# Patient Record
Sex: MALE | Race: WHITE | Employment: OTHER | URBAN - METROPOLITAN AREA
[De-identification: names, ages, dates, MRNs, and addresses within clinical notes are randomized per-mention and may not be internally consistent; named-entity substitution may affect disease eponyms.]

---

## 2024-03-08 ENCOUNTER — APPOINTMENT (OUTPATIENT)
Dept: CT IMAGING | Age: 84
DRG: 374 | End: 2024-03-08
Payer: COMMERCIAL

## 2024-03-08 ENCOUNTER — APPOINTMENT (OUTPATIENT)
Dept: GENERAL RADIOLOGY | Age: 84
DRG: 374 | End: 2024-03-08
Payer: COMMERCIAL

## 2024-03-08 ENCOUNTER — HOSPITAL ENCOUNTER (INPATIENT)
Age: 84
LOS: 6 days | Discharge: HOSPICE | DRG: 374 | End: 2024-03-14
Attending: EMERGENCY MEDICINE | Admitting: INTERNAL MEDICINE
Payer: COMMERCIAL

## 2024-03-08 DIAGNOSIS — K63.89 COLONIC MASS: ICD-10-CM

## 2024-03-08 DIAGNOSIS — R10.84 GENERALIZED ABDOMINAL PAIN: ICD-10-CM

## 2024-03-08 DIAGNOSIS — K92.0 HEMATEMESIS WITH NAUSEA: Primary | ICD-10-CM

## 2024-03-08 DIAGNOSIS — Z51.5 PALLIATIVE CARE ENCOUNTER: ICD-10-CM

## 2024-03-08 PROBLEM — R10.9 ABDOMINAL PAIN: Status: ACTIVE | Noted: 2024-03-08

## 2024-03-08 LAB
ABO + RH BLD: NORMAL
ALBUMIN SERPL-MCNC: 3.5 G/DL (ref 3.5–5.2)
ALP SERPL-CCNC: 216 U/L (ref 40–129)
ALT SERPL-CCNC: 15 U/L (ref 5–41)
ANION GAP SERPL CALCULATED.3IONS-SCNC: 20 MMOL/L (ref 9–17)
ARM BAND NUMBER: NORMAL
AST SERPL-CCNC: 18 U/L
BASOPHILS # BLD: 0 K/UL (ref 0–0.2)
BASOPHILS NFR BLD: 0 % (ref 0–2)
BILIRUB SERPL-MCNC: 0.5 MG/DL (ref 0.3–1.2)
BLOOD BANK SAMPLE EXPIRATION: NORMAL
BLOOD GROUP ANTIBODIES SERPL: NEGATIVE
BNP SERPL-MCNC: 843 PG/ML
BUN SERPL-MCNC: 38 MG/DL (ref 8–23)
CALCIUM SERPL-MCNC: 9.6 MG/DL (ref 8.6–10.4)
CHLORIDE SERPL-SCNC: 99 MMOL/L (ref 98–107)
CO2 SERPL-SCNC: 22 MMOL/L (ref 20–31)
CREAT SERPL-MCNC: 2.1 MG/DL (ref 0.7–1.2)
EOSINOPHIL # BLD: 0 K/UL (ref 0–0.4)
EOSINOPHILS RELATIVE PERCENT: 0 % (ref 0–4)
ERYTHROCYTE [DISTWIDTH] IN BLOOD BY AUTOMATED COUNT: 16.3 % (ref 11.5–14.9)
GFR SERPL CREATININE-BSD FRML MDRD: 31 ML/MIN/1.73M2
GLUCOSE SERPL-MCNC: 210 MG/DL (ref 70–99)
HCT VFR BLD AUTO: 35.5 % (ref 41–53)
HGB BLD-MCNC: 11.1 G/DL (ref 13.5–17.5)
LACTATE BLDV-SCNC: 1.2 MMOL/L (ref 0.5–1.9)
LACTATE BLDV-SCNC: 1.4 MMOL/L (ref 0.5–1.9)
LIPASE SERPL-CCNC: 11 U/L (ref 13–60)
LYMPHOCYTES NFR BLD: 1.16 K/UL (ref 1–4.8)
LYMPHOCYTES RELATIVE PERCENT: 8 % (ref 24–44)
MCH RBC QN AUTO: 25.8 PG (ref 26–34)
MCHC RBC AUTO-ENTMCNC: 31.3 G/DL (ref 31–37)
MCV RBC AUTO: 82.4 FL (ref 80–100)
MONOCYTES NFR BLD: 0.73 K/UL (ref 0.1–1.3)
MONOCYTES NFR BLD: 5 % (ref 1–7)
MORPHOLOGY: ABNORMAL
MORPHOLOGY: ABNORMAL
NEUTROPHILS NFR BLD: 87 % (ref 36–66)
NEUTS SEG NFR BLD: 12.61 K/UL (ref 1.3–9.1)
PLATELET # BLD AUTO: 646 K/UL (ref 150–450)
PMV BLD AUTO: 7 FL (ref 6–12)
POTASSIUM SERPL-SCNC: 4.6 MMOL/L (ref 3.7–5.3)
PROT SERPL-MCNC: 7.3 G/DL (ref 6.4–8.3)
RBC # BLD AUTO: 4.31 M/UL (ref 4.5–5.9)
SODIUM SERPL-SCNC: 141 MMOL/L (ref 135–144)
TROPONIN I SERPL HS-MCNC: 53 NG/L (ref 0–22)
TROPONIN I SERPL HS-MCNC: 58 NG/L (ref 0–22)
WBC OTHER # BLD: 14.5 K/UL (ref 3.5–11)

## 2024-03-08 PROCEDURE — 86900 BLOOD TYPING SEROLOGIC ABO: CPT

## 2024-03-08 PROCEDURE — 83880 ASSAY OF NATRIURETIC PEPTIDE: CPT

## 2024-03-08 PROCEDURE — C9113 INJ PANTOPRAZOLE SODIUM, VIA: HCPCS | Performed by: INTERNAL MEDICINE

## 2024-03-08 PROCEDURE — 87040 BLOOD CULTURE FOR BACTERIA: CPT

## 2024-03-08 PROCEDURE — 83605 ASSAY OF LACTIC ACID: CPT

## 2024-03-08 PROCEDURE — 86901 BLOOD TYPING SEROLOGIC RH(D): CPT

## 2024-03-08 PROCEDURE — 36415 COLL VENOUS BLD VENIPUNCTURE: CPT

## 2024-03-08 PROCEDURE — 6360000002 HC RX W HCPCS: Performed by: EMERGENCY MEDICINE

## 2024-03-08 PROCEDURE — 96375 TX/PRO/DX INJ NEW DRUG ADDON: CPT

## 2024-03-08 PROCEDURE — 6360000002 HC RX W HCPCS: Performed by: INTERNAL MEDICINE

## 2024-03-08 PROCEDURE — 85025 COMPLETE CBC W/AUTO DIFF WBC: CPT

## 2024-03-08 PROCEDURE — 0W9G30Z DRAINAGE OF PERITONEAL CAVITY WITH DRAINAGE DEVICE, PERCUTANEOUS APPROACH: ICD-10-PCS | Performed by: INTERNAL MEDICINE

## 2024-03-08 PROCEDURE — 74177 CT ABD & PELVIS W/CONTRAST: CPT

## 2024-03-08 PROCEDURE — C9113 INJ PANTOPRAZOLE SODIUM, VIA: HCPCS | Performed by: EMERGENCY MEDICINE

## 2024-03-08 PROCEDURE — 6360000004 HC RX CONTRAST MEDICATION: Performed by: EMERGENCY MEDICINE

## 2024-03-08 PROCEDURE — A4216 STERILE WATER/SALINE, 10 ML: HCPCS | Performed by: EMERGENCY MEDICINE

## 2024-03-08 PROCEDURE — 80053 COMPREHEN METABOLIC PANEL: CPT

## 2024-03-08 PROCEDURE — 84484 ASSAY OF TROPONIN QUANT: CPT

## 2024-03-08 PROCEDURE — 71045 X-RAY EXAM CHEST 1 VIEW: CPT

## 2024-03-08 PROCEDURE — 2060000000 HC ICU INTERMEDIATE R&B

## 2024-03-08 PROCEDURE — 2580000003 HC RX 258: Performed by: EMERGENCY MEDICINE

## 2024-03-08 PROCEDURE — 99285 EMERGENCY DEPT VISIT HI MDM: CPT

## 2024-03-08 PROCEDURE — 86850 RBC ANTIBODY SCREEN: CPT

## 2024-03-08 PROCEDURE — 83690 ASSAY OF LIPASE: CPT

## 2024-03-08 PROCEDURE — 99223 1ST HOSP IP/OBS HIGH 75: CPT | Performed by: INTERNAL MEDICINE

## 2024-03-08 PROCEDURE — 2580000003 HC RX 258: Performed by: INTERNAL MEDICINE

## 2024-03-08 PROCEDURE — 96365 THER/PROPH/DIAG IV INF INIT: CPT

## 2024-03-08 PROCEDURE — 93005 ELECTROCARDIOGRAM TRACING: CPT | Performed by: EMERGENCY MEDICINE

## 2024-03-08 RX ORDER — SODIUM CHLORIDE 9 MG/ML
INJECTION, SOLUTION INTRAVENOUS PRN
Status: DISCONTINUED | OUTPATIENT
Start: 2024-03-08 | End: 2024-03-14 | Stop reason: HOSPADM

## 2024-03-08 RX ORDER — SODIUM CHLORIDE 0.9 % (FLUSH) 0.9 %
10 SYRINGE (ML) INJECTION PRN
Status: COMPLETED | OUTPATIENT
Start: 2024-03-08 | End: 2024-03-08

## 2024-03-08 RX ORDER — POTASSIUM CHLORIDE 20 MEQ/1
40 TABLET, EXTENDED RELEASE ORAL PRN
Status: DISCONTINUED | OUTPATIENT
Start: 2024-03-08 | End: 2024-03-11

## 2024-03-08 RX ORDER — SODIUM CHLORIDE 9 MG/ML
INJECTION, SOLUTION INTRAVENOUS ONCE
Status: COMPLETED | OUTPATIENT
Start: 2024-03-08 | End: 2024-03-08

## 2024-03-08 RX ORDER — IBUPROFEN 200 MG
200 TABLET ORAL EVERY 6 HOURS PRN
COMMUNITY

## 2024-03-08 RX ORDER — 0.9 % SODIUM CHLORIDE 0.9 %
1000 INTRAVENOUS SOLUTION INTRAVENOUS ONCE
Status: COMPLETED | OUTPATIENT
Start: 2024-03-08 | End: 2024-03-08

## 2024-03-08 RX ORDER — ONDANSETRON 2 MG/ML
4 INJECTION INTRAMUSCULAR; INTRAVENOUS EVERY 6 HOURS PRN
Status: DISCONTINUED | OUTPATIENT
Start: 2024-03-08 | End: 2024-03-14 | Stop reason: HOSPADM

## 2024-03-08 RX ORDER — ENOXAPARIN SODIUM 100 MG/ML
40 INJECTION SUBCUTANEOUS DAILY
Status: DISCONTINUED | OUTPATIENT
Start: 2024-03-08 | End: 2024-03-14 | Stop reason: HOSPADM

## 2024-03-08 RX ORDER — POTASSIUM CHLORIDE 7.45 MG/ML
10 INJECTION INTRAVENOUS PRN
Status: DISCONTINUED | OUTPATIENT
Start: 2024-03-08 | End: 2024-03-11

## 2024-03-08 RX ORDER — POLYETHYLENE GLYCOL 3350 17 G/17G
17 POWDER, FOR SOLUTION ORAL DAILY PRN
Status: DISCONTINUED | OUTPATIENT
Start: 2024-03-08 | End: 2024-03-14 | Stop reason: HOSPADM

## 2024-03-08 RX ORDER — MAGNESIUM SULFATE HEPTAHYDRATE 40 MG/ML
2000 INJECTION, SOLUTION INTRAVENOUS PRN
Status: DISCONTINUED | OUTPATIENT
Start: 2024-03-08 | End: 2024-03-11

## 2024-03-08 RX ORDER — SODIUM CHLORIDE 0.9 % (FLUSH) 0.9 %
5-40 SYRINGE (ML) INJECTION PRN
Status: DISCONTINUED | OUTPATIENT
Start: 2024-03-08 | End: 2024-03-14 | Stop reason: HOSPADM

## 2024-03-08 RX ORDER — METRONIDAZOLE 500 MG/100ML
500 INJECTION, SOLUTION INTRAVENOUS ONCE
Status: COMPLETED | OUTPATIENT
Start: 2024-03-08 | End: 2024-03-08

## 2024-03-08 RX ORDER — 0.9 % SODIUM CHLORIDE 0.9 %
1600 INTRAVENOUS SOLUTION INTRAVENOUS ONCE
Status: COMPLETED | OUTPATIENT
Start: 2024-03-08 | End: 2024-03-08

## 2024-03-08 RX ORDER — SODIUM CHLORIDE 0.9 % (FLUSH) 0.9 %
5-40 SYRINGE (ML) INJECTION EVERY 12 HOURS SCHEDULED
Status: DISCONTINUED | OUTPATIENT
Start: 2024-03-08 | End: 2024-03-14 | Stop reason: HOSPADM

## 2024-03-08 RX ORDER — ACETAMINOPHEN 650 MG/1
650 SUPPOSITORY RECTAL EVERY 6 HOURS PRN
Status: DISCONTINUED | OUTPATIENT
Start: 2024-03-08 | End: 2024-03-14 | Stop reason: HOSPADM

## 2024-03-08 RX ORDER — ONDANSETRON 4 MG/1
4 TABLET, ORALLY DISINTEGRATING ORAL EVERY 8 HOURS PRN
Status: DISCONTINUED | OUTPATIENT
Start: 2024-03-08 | End: 2024-03-14 | Stop reason: HOSPADM

## 2024-03-08 RX ORDER — ACETAMINOPHEN 325 MG/1
650 TABLET ORAL EVERY 6 HOURS PRN
Status: DISCONTINUED | OUTPATIENT
Start: 2024-03-08 | End: 2024-03-14 | Stop reason: HOSPADM

## 2024-03-08 RX ADMIN — PANTOPRAZOLE SODIUM 40 MG: 40 INJECTION, POWDER, FOR SOLUTION INTRAVENOUS at 18:06

## 2024-03-08 RX ADMIN — SODIUM CHLORIDE, PRESERVATIVE FREE 10 ML: 5 INJECTION INTRAVENOUS at 16:35

## 2024-03-08 RX ADMIN — METRONIDAZOLE 500 MG: 500 INJECTION, SOLUTION INTRAVENOUS at 18:14

## 2024-03-08 RX ADMIN — PIPERACILLIN AND TAZOBACTAM 4500 MG: 4; .5 INJECTION, POWDER, FOR SOLUTION INTRAVENOUS at 23:32

## 2024-03-08 RX ADMIN — SODIUM CHLORIDE 1600 ML: 9 INJECTION, SOLUTION INTRAVENOUS at 15:45

## 2024-03-08 RX ADMIN — PANTOPRAZOLE SODIUM 8 MG/HR: 40 INJECTION, POWDER, FOR SOLUTION INTRAVENOUS at 23:26

## 2024-03-08 RX ADMIN — SODIUM CHLORIDE 1000 ML: 9 INJECTION, SOLUTION INTRAVENOUS at 15:22

## 2024-03-08 RX ADMIN — IOPAMIDOL 75 ML: 755 INJECTION, SOLUTION INTRAVENOUS at 16:35

## 2024-03-08 RX ADMIN — CEFTRIAXONE SODIUM 1000 MG: 1 INJECTION, POWDER, FOR SOLUTION INTRAMUSCULAR; INTRAVENOUS at 14:33

## 2024-03-08 RX ADMIN — SODIUM CHLORIDE: 9 INJECTION, SOLUTION INTRAVENOUS at 16:35

## 2024-03-08 RX ADMIN — SODIUM CHLORIDE 40 MG: 9 INJECTION INTRAMUSCULAR; INTRAVENOUS; SUBCUTANEOUS at 14:27

## 2024-03-08 RX ADMIN — OCTREOTIDE ACETATE 25 MCG/HR: 500 INJECTION, SOLUTION INTRAVENOUS; SUBCUTANEOUS at 15:24

## 2024-03-08 ASSESSMENT — LIFESTYLE VARIABLES
HOW MANY STANDARD DRINKS CONTAINING ALCOHOL DO YOU HAVE ON A TYPICAL DAY: 1 OR 2
HOW OFTEN DO YOU HAVE A DRINK CONTAINING ALCOHOL: MONTHLY OR LESS

## 2024-03-08 ASSESSMENT — PAIN - FUNCTIONAL ASSESSMENT: PAIN_FUNCTIONAL_ASSESSMENT: NONE - DENIES PAIN

## 2024-03-08 NOTE — ED PROVIDER NOTES
20 cc/kg bolus ordered. Already received rocephin which is adequate to treat presumed intra-abdmominal source as I see no evidence of extra-abdominal infection.     CT on my review appears to show large liver mass likely cancerous, unknown primary.     Discussed with Brendan Gallagher NP on call for GI, she recommends protonix gtt, may have ice chips until MN and then NPO, they will follow.     Discussed with Dr Childers who accepts patient for admission.     ED Course as of 03/08/24 1801   Fri Mar 08, 2024   1524 After initial assessment, high concern for upper GI bled due to undiagnosed liver disease so rocephin, octreotide and protonix  [MK]      ED Course User Index  [MK] Ayah Johnson MD     CRITICAL CARE:       PROCEDURES:    Procedures    DIAGNOSTIC RESULTS   EKG: All EKG's are interpreted by the Emergency Department Physician who either signs or Co-signs this chart inthe absence of a cardiologist.      RADIOLOGY:All plain film, CT, MRI, and formal ultrasound images (except ED bedside ultrasound) are read by the radiologist, see reports below, unless otherwise noted in MDM or here.  CT ABDOMEN PELVIS W IV CONTRAST Additional Contrast? None    (Results Pending)     LABS: All lab results were reviewed by myself, and all abnormals are listed below.  Labs Reviewed   COMPREHENSIVE METABOLIC PANEL - Abnormal; Notable for the following components:       Result Value    Anion Gap 20 (*)     Glucose 210 (*)     BUN 38 (*)     Creatinine 2.1 (*)     Est, Glom Filt Rate 31 (*)     Alkaline Phosphatase 216 (*)     All other components within normal limits   CBC WITH AUTO DIFFERENTIAL - Abnormal; Notable for the following components:    WBC 14.5 (*)     RBC 4.31 (*)     Hemoglobin 11.1 (*)     Hematocrit 35.5 (*)     MCH 25.8 (*)     RDW 16.3 (*)     Platelets 646 (*)     Neutrophils % 87 (*)     Lymphocytes % 8 (*)     Neutrophils Absolute 12.61 (*)     All other components within normal limits   LIPASE - Abnormal; Notable

## 2024-03-09 PROBLEM — R60.9 EDEMA: Status: ACTIVE | Noted: 2024-03-09

## 2024-03-09 PROBLEM — K92.0 HEMATEMESIS WITH NAUSEA: Status: ACTIVE | Noted: 2024-03-09

## 2024-03-09 PROBLEM — K63.89 COLONIC MASS: Status: ACTIVE | Noted: 2024-03-09

## 2024-03-09 PROBLEM — R63.4 WEIGHT LOSS: Status: ACTIVE | Noted: 2024-03-09

## 2024-03-09 PROBLEM — K76.9 LIVER LESION: Status: ACTIVE | Noted: 2024-03-09

## 2024-03-09 PROBLEM — I49.3 PVC (PREMATURE VENTRICULAR CONTRACTION): Status: ACTIVE | Noted: 2024-03-09

## 2024-03-09 PROBLEM — R79.89 TROPONIN LEVEL ELEVATED: Status: ACTIVE | Noted: 2024-03-09

## 2024-03-09 PROBLEM — R00.0 TACHYCARDIA: Status: ACTIVE | Noted: 2024-03-09

## 2024-03-09 LAB
A1AT SERPL-MCNC: 294 MG/DL (ref 90–200)
ABSOLUTE BANDS: 0.15 K/UL (ref 0–1)
AFP SERPL-MCNC: <1.8 UG/L
ALBUMIN SERPL-MCNC: 3.1 G/DL (ref 3.5–5.2)
ALP SERPL-CCNC: 180 U/L (ref 40–129)
ALT SERPL-CCNC: 12 U/L (ref 5–41)
ANION GAP SERPL CALCULATED.3IONS-SCNC: 14 MMOL/L (ref 9–17)
AST SERPL-CCNC: 13 U/L
BACTERIA URNS QL MICRO: ABNORMAL
BANDS: 1 % (ref 0–10)
BASOPHILS # BLD: 0 K/UL (ref 0–0.2)
BASOPHILS NFR BLD: 0 % (ref 0–2)
BILIRUB SERPL-MCNC: 0.5 MG/DL (ref 0.3–1.2)
BILIRUB UR QL STRIP: NEGATIVE
BUN SERPL-MCNC: 39 MG/DL (ref 8–23)
CALCIUM SERPL-MCNC: 8.9 MG/DL (ref 8.6–10.4)
CASTS #/AREA URNS LPF: ABNORMAL /LPF
CEA SERPL-MCNC: 15 NG/ML (ref 0–3.8)
CERULOPLASMIN SERPL-MCNC: 29 MG/DL (ref 15–30)
CHLORIDE SERPL-SCNC: 103 MMOL/L (ref 98–107)
CLARITY UR: ABNORMAL
CO2 SERPL-SCNC: 24 MMOL/L (ref 20–31)
COLOR UR: YELLOW
CREAT SERPL-MCNC: 2.3 MG/DL (ref 0.7–1.2)
EKG ATRIAL RATE: 117 BPM
EKG P AXIS: 56 DEGREES
EKG P-R INTERVAL: 124 MS
EKG Q-T INTERVAL: 328 MS
EKG QRS DURATION: 80 MS
EKG QTC CALCULATION (BAZETT): 457 MS
EKG R AXIS: -16 DEGREES
EKG T AXIS: 72 DEGREES
EKG VENTRICULAR RATE: 117 BPM
EOSINOPHIL # BLD: 0 K/UL (ref 0–0.4)
EOSINOPHILS RELATIVE PERCENT: 0 % (ref 0–4)
EPI CELLS #/AREA URNS HPF: ABNORMAL /HPF
ERYTHROCYTE [DISTWIDTH] IN BLOOD BY AUTOMATED COUNT: 16.3 % (ref 11.5–14.9)
FERRITIN SERPL-MCNC: 236 NG/ML (ref 30–400)
FOLATE SERPL-MCNC: 3.1 NG/ML (ref 4.8–24.2)
GFR SERPL CREATININE-BSD FRML MDRD: 27 ML/MIN/1.73M2
GLUCOSE SERPL-MCNC: 311 MG/DL (ref 70–99)
GLUCOSE UR STRIP-MCNC: NEGATIVE MG/DL
HAV IGM SERPL QL IA: NONREACTIVE
HBV CORE IGM SERPL QL IA: NONREACTIVE
HBV SURFACE AG SERPL QL IA: NONREACTIVE
HCT VFR BLD AUTO: 33.6 % (ref 41–53)
HCV AB SERPL QL IA: NONREACTIVE
HGB BLD-MCNC: 10.3 G/DL (ref 13.5–17.5)
HGB UR QL STRIP.AUTO: NEGATIVE
IRON SATN MFR SERPL: 19 % (ref 20–55)
IRON SERPL-MCNC: 32 UG/DL (ref 61–157)
KETONES UR STRIP-MCNC: ABNORMAL MG/DL
LEUKOCYTE ESTERASE UR QL STRIP: NEGATIVE
LYMPHOCYTES NFR BLD: 0.59 K/UL (ref 1–4.8)
LYMPHOCYTES RELATIVE PERCENT: 4 % (ref 24–44)
MCH RBC QN AUTO: 25.8 PG (ref 26–34)
MCHC RBC AUTO-ENTMCNC: 30.8 G/DL (ref 31–37)
MCV RBC AUTO: 83.8 FL (ref 80–100)
MONOCYTES NFR BLD: 1.47 K/UL (ref 0.1–1.3)
MONOCYTES NFR BLD: 10 % (ref 1–7)
MORPHOLOGY: ABNORMAL
MORPHOLOGY: ABNORMAL
NEUTROPHILS NFR BLD: 85 % (ref 36–66)
NEUTS SEG NFR BLD: 12.49 K/UL (ref 1.3–9.1)
NITRITE UR QL STRIP: NEGATIVE
PH UR STRIP: 5 [PH] (ref 5–8)
PLATELET # BLD AUTO: 545 K/UL (ref 150–450)
PMV BLD AUTO: 6.8 FL (ref 6–12)
POTASSIUM SERPL-SCNC: 4.5 MMOL/L (ref 3.7–5.3)
PROT SERPL-MCNC: 6.7 G/DL (ref 6.4–8.3)
PROT UR STRIP-MCNC: ABNORMAL MG/DL
RBC # BLD AUTO: 4.01 M/UL (ref 4.5–5.9)
RBC #/AREA URNS HPF: ABNORMAL /HPF
SODIUM SERPL-SCNC: 141 MMOL/L (ref 135–144)
SP GR UR STRIP: 1.03 (ref 1–1.03)
TIBC SERPL-MCNC: 169 UG/DL (ref 250–450)
UNSATURATED IRON BINDING CAPACITY: 137 UG/DL (ref 112–347)
UROBILINOGEN UR STRIP-ACNC: NORMAL EU/DL (ref 0–1)
VIT B12 SERPL-MCNC: 381 PG/ML (ref 232–1245)
WBC #/AREA URNS HPF: ABNORMAL /HPF
WBC OTHER # BLD: 14.7 K/UL (ref 3.5–11)

## 2024-03-09 PROCEDURE — 86376 MICROSOMAL ANTIBODY EACH: CPT

## 2024-03-09 PROCEDURE — 99223 1ST HOSP IP/OBS HIGH 75: CPT | Performed by: INTERNAL MEDICINE

## 2024-03-09 PROCEDURE — 99233 SBSQ HOSP IP/OBS HIGH 50: CPT | Performed by: INTERNAL MEDICINE

## 2024-03-09 PROCEDURE — 82390 ASSAY OF CERULOPLASMIN: CPT

## 2024-03-09 PROCEDURE — 81001 URINALYSIS AUTO W/SCOPE: CPT

## 2024-03-09 PROCEDURE — 82105 ALPHA-FETOPROTEIN SERUM: CPT

## 2024-03-09 PROCEDURE — 82607 VITAMIN B-12: CPT

## 2024-03-09 PROCEDURE — 83540 ASSAY OF IRON: CPT

## 2024-03-09 PROCEDURE — 80053 COMPREHEN METABOLIC PANEL: CPT

## 2024-03-09 PROCEDURE — 6360000002 HC RX W HCPCS: Performed by: INTERNAL MEDICINE

## 2024-03-09 PROCEDURE — APPNB30 APP NON BILLABLE TIME 0-30 MINS: Performed by: NURSE PRACTITIONER

## 2024-03-09 PROCEDURE — 85025 COMPLETE CBC W/AUTO DIFF WBC: CPT

## 2024-03-09 PROCEDURE — 86225 DNA ANTIBODY NATIVE: CPT

## 2024-03-09 PROCEDURE — 83516 IMMUNOASSAY NONANTIBODY: CPT

## 2024-03-09 PROCEDURE — 86038 ANTINUCLEAR ANTIBODIES: CPT

## 2024-03-09 PROCEDURE — 2580000003 HC RX 258: Performed by: INTERNAL MEDICINE

## 2024-03-09 PROCEDURE — 82746 ASSAY OF FOLIC ACID SERUM: CPT

## 2024-03-09 PROCEDURE — 82728 ASSAY OF FERRITIN: CPT

## 2024-03-09 PROCEDURE — 36415 COLL VENOUS BLD VENIPUNCTURE: CPT

## 2024-03-09 PROCEDURE — C9113 INJ PANTOPRAZOLE SODIUM, VIA: HCPCS | Performed by: INTERNAL MEDICINE

## 2024-03-09 PROCEDURE — 99222 1ST HOSP IP/OBS MODERATE 55: CPT | Performed by: INTERNAL MEDICINE

## 2024-03-09 PROCEDURE — 82103 ALPHA-1-ANTITRYPSIN TOTAL: CPT

## 2024-03-09 PROCEDURE — 82378 CARCINOEMBRYONIC ANTIGEN: CPT

## 2024-03-09 PROCEDURE — 83550 IRON BINDING TEST: CPT

## 2024-03-09 PROCEDURE — 2060000000 HC ICU INTERMEDIATE R&B

## 2024-03-09 PROCEDURE — 80074 ACUTE HEPATITIS PANEL: CPT

## 2024-03-09 RX ORDER — FUROSEMIDE 10 MG/ML
20 INJECTION INTRAMUSCULAR; INTRAVENOUS DAILY
Status: DISCONTINUED | OUTPATIENT
Start: 2024-03-09 | End: 2024-03-10

## 2024-03-09 RX ORDER — SODIUM CHLORIDE 9 MG/ML
INJECTION, SOLUTION INTRAVENOUS CONTINUOUS
Status: DISCONTINUED | OUTPATIENT
Start: 2024-03-09 | End: 2024-03-12

## 2024-03-09 RX ADMIN — PANTOPRAZOLE SODIUM 8 MG/HR: 40 INJECTION, POWDER, FOR SOLUTION INTRAVENOUS at 09:14

## 2024-03-09 RX ADMIN — SODIUM CHLORIDE: 9 INJECTION, SOLUTION INTRAVENOUS at 18:26

## 2024-03-09 RX ADMIN — PIPERACILLIN AND TAZOBACTAM 3375 MG: 3; .375 INJECTION, POWDER, LYOPHILIZED, FOR SOLUTION INTRAVENOUS at 22:58

## 2024-03-09 RX ADMIN — OCTREOTIDE ACETATE 25 MCG/HR: 500 INJECTION, SOLUTION INTRAVENOUS; SUBCUTANEOUS at 09:15

## 2024-03-09 RX ADMIN — PANTOPRAZOLE SODIUM 8 MG/HR: 40 INJECTION, POWDER, FOR SOLUTION INTRAVENOUS at 18:50

## 2024-03-09 RX ADMIN — PIPERACILLIN AND TAZOBACTAM 3375 MG: 3; .375 INJECTION, POWDER, LYOPHILIZED, FOR SOLUTION INTRAVENOUS at 13:42

## 2024-03-09 RX ADMIN — PIPERACILLIN AND TAZOBACTAM 3375 MG: 3; .375 INJECTION, POWDER, LYOPHILIZED, FOR SOLUTION INTRAVENOUS at 07:08

## 2024-03-09 RX ADMIN — FUROSEMIDE 20 MG: 10 INJECTION, SOLUTION INTRAMUSCULAR; INTRAVENOUS at 11:39

## 2024-03-09 NOTE — H&P
got admitted  Started on PPI and octreotide drip  GI consulted    GILBERTO, creatinine is 2, no previous available  Will check bladder scan  Patient has been having anasarca, will likely need Lasix, consult nephrology  Monitor I's and O's closely    Troponin elevation, patient currently denies any chest pain not a candidate for anticoagulation due to hematemesis, also had elevated proBNP, check echocardiogram consult cardiology  No previous cardiac history known to patient    Leukocytosis, patient does have mild diffuse tenderness, will order IR guided paracentesis, leukocytosis could be secondary to underlying malignancy cultures has been ordered already    Thrombocytosis likely reactive secondary to abovepatient overall prognosis is poor, CODE STATUS discussed, patient wants to be DNR CCA with no intubation    DVT prophylaxis SCDs for now  Will also check lower extremity Doppler to rule out any DVT in the presence of active malignancy    Consultations:   IP CONSULT TO NEPHROLOGY  IP CONSULT TO GI  IP CONSULT TO CARDIOLOGY  IP CONSULT TO INTERVENTIONAL RADIOLOGY  IP CONSULT TO HEM/ONC     Patient is admitted as inpatient status because of co-morbidities listed above, severity of signs and symptoms as outlined, requirement for current medical therapies and most importantly because of direct risk to patient if care not provided in a hospital setting.    Lavern Childers MD  3/8/2024  7:46 PM    Copy sent to Dr. Moseley primary care provider on file.    Please note that this chart was generated using voice recognition Dragon dictation software.  Although every effort was made to ensure the accuracy of this automated transcription, some errors in transcription may have occurred.

## 2024-03-09 NOTE — PLAN OF CARE
Problem: ABCDS Injury Assessment  Goal: Absence of physical injury  Outcome: Progressing  Note: Pt absent of any physical injury     Problem: Skin/Tissue Integrity  Goal: Absence of new skin breakdown  Description: 1.  Monitor for areas of redness and/or skin breakdown  2.  Assess vascular access sites hourly  3.  Every 4-6 hours minimum:  Change oxygen saturation probe site  4.  Every 4-6 hours:  If on nasal continuous positive airway pressure, respiratory therapy assess nares and determine need for appliance change or resting period.  Outcome: Progressing  Note: Pt absent of any new skin breakdown     Problem: Pain  Goal: Verbalizes/displays adequate comfort level or baseline comfort level  Outcome: Progressing  Note: Pt able to verbalize comfort level

## 2024-03-09 NOTE — ACP (ADVANCE CARE PLANNING)
Advance Care Planning     Advance Care Planning Activator (Inpatient)  Conversation Note      Date of ACP Conversation: 3/9/2024     Conversation Conducted with: Patient with Decision Making Capacity    ACP Activator: Jie Robins RN    Health Care Decision Maker:     Current Designated Health Care Decision Maker:     Click here to complete Healthcare Decision Makers including section of the Healthcare Decision Maker Relationship (ie \"Primary\")  Today we documented Decision Maker(s) consistent with Legal Next of Kin hierarchy.    Care Preferences    Ventilation:  \"If you were in your present state of health and suddenly became very ill and were unable to breathe on your own, what would your preference be about the use of a ventilator (breathing machine) if it were available to you?\"      Would the patient desire the use of ventilator (breathing machine)?: no    \"If your health worsens and it becomes clear that your chance of recovery is unlikely, what would your preference be about the use of a ventilator (breathing machine) if it were available to you?\"     Would the patient desire the use of ventilator (breathing machine)?: No      Resuscitation  \"CPR works best to restart the heart when there is a sudden event, like a heart attack, in someone who is otherwise healthy. Unfortunately, CPR does not typically restart the heart for people who have serious health conditions or who are very sick.\"    \"In the event your heart stopped as a result of an underlying serious health condition, would you want attempts to be made to restart your heart (answer \"yes\" for attempt to resuscitate) or would you prefer a natural death (answer \"no\" for do not attempt to resuscitate)?\" no       [] Yes   [x] No   Educated Patient / Decision Maker regarding differences between Advance Directives and portable DNR orders.    Length of ACP Conversation in minutes:      Conversation Outcomes:  ACP discussion completed    Follow-up plan:

## 2024-03-09 NOTE — PLAN OF CARE
Problem: Discharge Planning  Goal: Discharge to home or other facility with appropriate resources  3/9/2024 1657 by Radha Calderón RN  Outcome: Progressing     Problem: Safety - Adult  Goal: Free from fall injury  3/9/2024 1657 by Radha Calderón RN  Outcome: Progressing     Problem: ABCDS Injury Assessment  Goal: Absence of physical injury  3/9/2024 1657 by Radha Calderón RN  Outcome: Progressing     Problem: Skin/Tissue Integrity  Goal: Absence of new skin breakdown  Description: 1.  Monitor for areas of redness and/or skin breakdown  2.  Assess vascular access sites hourly  3.  Every 4-6 hours minimum:  Change oxygen saturation probe site  4.  Every 4-6 hours:  If on nasal continuous positive airway pressure, respiratory therapy assess nares and determine need for appliance change or resting period.  3/9/2024 1657 by Radha Calderón RN  Outcome: Progressing     Problem: Pain  Goal: Verbalizes/displays adequate comfort level or baseline comfort level  3/9/2024 1657 by Radha Calderón RN  Outcome: Progressing

## 2024-03-10 LAB
ALBUMIN SERPL-MCNC: 2.9 G/DL (ref 3.5–5.2)
ALP SERPL-CCNC: 153 U/L (ref 40–129)
ALT SERPL-CCNC: 9 U/L (ref 5–41)
ANION GAP SERPL CALCULATED.3IONS-SCNC: 10 MMOL/L (ref 9–17)
AST SERPL-CCNC: 9 U/L
BASOPHILS # BLD: 0 K/UL (ref 0–0.2)
BASOPHILS NFR BLD: 0 % (ref 0–2)
BILIRUB SERPL-MCNC: 0.4 MG/DL (ref 0.3–1.2)
BUN SERPL-MCNC: 37 MG/DL (ref 8–23)
CALCIUM SERPL-MCNC: 8.6 MG/DL (ref 8.6–10.4)
CHLORIDE SERPL-SCNC: 107 MMOL/L (ref 98–107)
CO2 SERPL-SCNC: 27 MMOL/L (ref 20–31)
CREAT SERPL-MCNC: 2.4 MG/DL (ref 0.7–1.2)
EOSINOPHIL # BLD: 0.11 K/UL (ref 0–0.4)
EOSINOPHILS RELATIVE PERCENT: 1 % (ref 0–4)
ERYTHROCYTE [DISTWIDTH] IN BLOOD BY AUTOMATED COUNT: 16.3 % (ref 11.5–14.9)
GFR SERPL CREATININE-BSD FRML MDRD: 26 ML/MIN/1.73M2
GLUCOSE SERPL-MCNC: 175 MG/DL (ref 70–99)
HCT VFR BLD AUTO: 30.8 % (ref 41–53)
HGB BLD-MCNC: 9.4 G/DL (ref 13.5–17.5)
LKM AB TITR SER IF: NORMAL {TITER}
LYMPHOCYTES NFR BLD: 1.22 K/UL (ref 1–4.8)
LYMPHOCYTES RELATIVE PERCENT: 11 % (ref 24–44)
MCH RBC QN AUTO: 25.3 PG (ref 26–34)
MCHC RBC AUTO-ENTMCNC: 30.5 G/DL (ref 31–37)
MCV RBC AUTO: 83 FL (ref 80–100)
MONOCYTES NFR BLD: 0.67 K/UL (ref 0.1–1.3)
MONOCYTES NFR BLD: 6 % (ref 1–7)
MORPHOLOGY: ABNORMAL
NEUTROPHILS NFR BLD: 82 % (ref 36–66)
NEUTS SEG NFR BLD: 9.1 K/UL (ref 1.3–9.1)
PLATELET # BLD AUTO: 467 K/UL (ref 150–450)
PMV BLD AUTO: 6.6 FL (ref 6–12)
POTASSIUM SERPL-SCNC: 3.9 MMOL/L (ref 3.7–5.3)
PROT SERPL-MCNC: 6.1 G/DL (ref 6.4–8.3)
RBC # BLD AUTO: 3.71 M/UL (ref 4.5–5.9)
SMOOTH MUSCLE ANTIBODY: 8 UNITS (ref 0–19)
SODIUM SERPL-SCNC: 144 MMOL/L (ref 135–144)
WBC OTHER # BLD: 11.1 K/UL (ref 3.5–11)

## 2024-03-10 PROCEDURE — 99231 SBSQ HOSP IP/OBS SF/LOW 25: CPT | Performed by: INTERNAL MEDICINE

## 2024-03-10 PROCEDURE — 6370000000 HC RX 637 (ALT 250 FOR IP): Performed by: INTERNAL MEDICINE

## 2024-03-10 PROCEDURE — 93005 ELECTROCARDIOGRAM TRACING: CPT | Performed by: INTERNAL MEDICINE

## 2024-03-10 PROCEDURE — P9047 ALBUMIN (HUMAN), 25%, 50ML: HCPCS | Performed by: INTERNAL MEDICINE

## 2024-03-10 PROCEDURE — 2580000003 HC RX 258: Performed by: INTERNAL MEDICINE

## 2024-03-10 PROCEDURE — 6370000000 HC RX 637 (ALT 250 FOR IP): Performed by: NURSE PRACTITIONER

## 2024-03-10 PROCEDURE — 36415 COLL VENOUS BLD VENIPUNCTURE: CPT

## 2024-03-10 PROCEDURE — 99233 SBSQ HOSP IP/OBS HIGH 50: CPT | Performed by: INTERNAL MEDICINE

## 2024-03-10 PROCEDURE — 97530 THERAPEUTIC ACTIVITIES: CPT

## 2024-03-10 PROCEDURE — 6360000002 HC RX W HCPCS: Performed by: INTERNAL MEDICINE

## 2024-03-10 PROCEDURE — 2060000000 HC ICU INTERMEDIATE R&B

## 2024-03-10 PROCEDURE — 97162 PT EVAL MOD COMPLEX 30 MIN: CPT

## 2024-03-10 PROCEDURE — C9113 INJ PANTOPRAZOLE SODIUM, VIA: HCPCS | Performed by: INTERNAL MEDICINE

## 2024-03-10 PROCEDURE — APPSS30 APP SPLIT SHARED TIME 16-30 MINUTES: Performed by: NURSE PRACTITIONER

## 2024-03-10 PROCEDURE — 80053 COMPREHEN METABOLIC PANEL: CPT

## 2024-03-10 PROCEDURE — 99232 SBSQ HOSP IP/OBS MODERATE 35: CPT | Performed by: INTERNAL MEDICINE

## 2024-03-10 PROCEDURE — 85025 COMPLETE CBC W/AUTO DIFF WBC: CPT

## 2024-03-10 RX ORDER — BISACODYL 5 MG/1
20 TABLET, DELAYED RELEASE ORAL ONCE
Status: COMPLETED | OUTPATIENT
Start: 2024-03-10 | End: 2024-03-10

## 2024-03-10 RX ORDER — LANOLIN ALCOHOL/MO/W.PET/CERES
3 CREAM (GRAM) TOPICAL NIGHTLY PRN
Status: DISCONTINUED | OUTPATIENT
Start: 2024-03-10 | End: 2024-03-14 | Stop reason: HOSPADM

## 2024-03-10 RX ORDER — ALBUMIN (HUMAN) 12.5 G/50ML
25 SOLUTION INTRAVENOUS EVERY 8 HOURS
Status: DISCONTINUED | OUTPATIENT
Start: 2024-03-10 | End: 2024-03-14 | Stop reason: HOSPADM

## 2024-03-10 RX ADMIN — PIPERACILLIN AND TAZOBACTAM 3375 MG: 3; .375 INJECTION, POWDER, LYOPHILIZED, FOR SOLUTION INTRAVENOUS at 23:46

## 2024-03-10 RX ADMIN — BISACODYL 20 MG: 5 TABLET, COATED ORAL at 09:41

## 2024-03-10 RX ADMIN — ALBUMIN (HUMAN) 25 G: 0.25 INJECTION, SOLUTION INTRAVENOUS at 15:36

## 2024-03-10 RX ADMIN — PIPERACILLIN AND TAZOBACTAM 3375 MG: 3; .375 INJECTION, POWDER, LYOPHILIZED, FOR SOLUTION INTRAVENOUS at 16:19

## 2024-03-10 RX ADMIN — SODIUM CHLORIDE: 9 INJECTION, SOLUTION INTRAVENOUS at 04:50

## 2024-03-10 RX ADMIN — PANTOPRAZOLE SODIUM 8 MG/HR: 40 INJECTION, POWDER, FOR SOLUTION INTRAVENOUS at 05:13

## 2024-03-10 RX ADMIN — PIPERACILLIN AND TAZOBACTAM 3375 MG: 3; .375 INJECTION, POWDER, LYOPHILIZED, FOR SOLUTION INTRAVENOUS at 09:36

## 2024-03-10 RX ADMIN — Medication 10 ML: at 09:42

## 2024-03-10 RX ADMIN — ALBUMIN (HUMAN) 25 G: 0.25 INJECTION, SOLUTION INTRAVENOUS at 20:37

## 2024-03-10 RX ADMIN — OCTREOTIDE ACETATE 25 MCG/HR: 500 INJECTION, SOLUTION INTRAVENOUS; SUBCUTANEOUS at 04:53

## 2024-03-10 RX ADMIN — Medication 3 MG: at 22:04

## 2024-03-10 RX ADMIN — OCTREOTIDE ACETATE 25 MCG/HR: 500 INJECTION, SOLUTION INTRAVENOUS; SUBCUTANEOUS at 23:47

## 2024-03-10 RX ADMIN — FUROSEMIDE 20 MG: 10 INJECTION, SOLUTION INTRAMUSCULAR; INTRAVENOUS at 09:41

## 2024-03-10 RX ADMIN — POLYETHYLENE GLYCOL-3350 AND ELECTROLYTES 4000 ML: 236; 6.74; 5.86; 2.97; 22.74 POWDER, FOR SOLUTION ORAL at 12:19

## 2024-03-10 RX ADMIN — PANTOPRAZOLE SODIUM 8 MG/HR: 40 INJECTION, POWDER, FOR SOLUTION INTRAVENOUS at 16:38

## 2024-03-10 NOTE — PLAN OF CARE
Problem: Discharge Planning  Goal: Discharge to home or other facility with appropriate resources  3/10/2024 0342 by Stewart Chung, RN  Outcome: Progressing     Problem: Safety - Adult  Goal: Free from fall injury  3/10/2024 0342 by Stewart Chung, RN  Outcome: Progressing     Problem: Skin/Tissue Integrity  Goal: Absence of new skin breakdown  Description: 1.  Monitor for areas of redness and/or skin breakdown  2.  Assess vascular access sites hourly  3.  Every 4-6 hours minimum:  Change oxygen saturation probe site  4.  Every 4-6 hours:  If on nasal continuous positive airway pressure, respiratory therapy assess nares and determine need for appliance change or resting period.  3/10/2024 0342 by Stewart Chung, RN  Outcome: Progressing     Problem: Pain  Goal: Verbalizes/displays adequate comfort level or baseline comfort level  3/10/2024 0342 by Stewart Chung, RN  Outcome: Progressing

## 2024-03-10 NOTE — PLAN OF CARE
Problem: Discharge Planning  Goal: Discharge to home or other facility with appropriate resources  Outcome: Progressing     Problem: Safety - Adult  Goal: Free from fall injury  Outcome: Progressing     Problem: ABCDS Injury Assessment  Goal: Absence of physical injury  Outcome: Progressing  Flowsheets (Taken 3/10/2024 1007)  Absence of Physical Injury: Implement safety measures based on patient assessment

## 2024-03-11 ENCOUNTER — APPOINTMENT (OUTPATIENT)
Dept: INTERVENTIONAL RADIOLOGY/VASCULAR | Age: 84
DRG: 374 | End: 2024-03-11
Payer: COMMERCIAL

## 2024-03-11 ENCOUNTER — APPOINTMENT (OUTPATIENT)
Age: 84
DRG: 374 | End: 2024-03-11
Attending: INTERNAL MEDICINE
Payer: COMMERCIAL

## 2024-03-11 ENCOUNTER — APPOINTMENT (OUTPATIENT)
Dept: GENERAL RADIOLOGY | Age: 84
DRG: 374 | End: 2024-03-11
Payer: COMMERCIAL

## 2024-03-11 ENCOUNTER — APPOINTMENT (OUTPATIENT)
Dept: CT IMAGING | Age: 84
DRG: 374 | End: 2024-03-11
Payer: COMMERCIAL

## 2024-03-11 ENCOUNTER — HOSPITAL ENCOUNTER (INPATIENT)
Dept: VASCULAR LAB | Age: 84
Discharge: HOME | DRG: 374 | End: 2024-03-13
Attending: INTERNAL MEDICINE
Payer: COMMERCIAL

## 2024-03-11 PROBLEM — Z71.89 GOALS OF CARE, COUNSELING/DISCUSSION: Status: ACTIVE | Noted: 2024-03-11

## 2024-03-11 PROBLEM — Z71.89 ACP (ADVANCE CARE PLANNING): Status: ACTIVE | Noted: 2024-03-11

## 2024-03-11 PROBLEM — Z71.89 DNR (DO NOT RESUSCITATE) DISCUSSION: Status: ACTIVE | Noted: 2024-03-11

## 2024-03-11 PROBLEM — R14.0 ABDOMINAL DISTENTION: Status: ACTIVE | Noted: 2024-03-11

## 2024-03-11 PROBLEM — J96.00 ACUTE RESPIRATORY FAILURE (HCC): Status: ACTIVE | Noted: 2024-03-11

## 2024-03-11 PROBLEM — Z51.5 PALLIATIVE CARE ENCOUNTER: Status: ACTIVE | Noted: 2024-03-11

## 2024-03-11 PROBLEM — K56.609 SBO (SMALL BOWEL OBSTRUCTION) (HCC): Status: ACTIVE | Noted: 2024-03-11

## 2024-03-11 LAB
ABSOLUTE BANDS: 2.1 K/UL (ref 0–1)
ANION GAP SERPL CALCULATED.3IONS-SCNC: 17 MMOL/L (ref 9–17)
BANDS: 12 % (ref 0–10)
BASOPHILS # BLD: 0 K/UL (ref 0–0.2)
BASOPHILS NFR BLD: 0 % (ref 0–2)
BUN SERPL-MCNC: 36 MG/DL (ref 8–23)
CALCIUM SERPL-MCNC: 9.1 MG/DL (ref 8.6–10.4)
CHLORIDE SERPL-SCNC: 103 MMOL/L (ref 98–107)
CO2 SERPL-SCNC: 24 MMOL/L (ref 20–31)
CREAT SERPL-MCNC: 2.7 MG/DL (ref 0.7–1.2)
ECHO AO ROOT DIAM: 3.5 CM
ECHO AO ROOT INDEX: 1.64 CM/M2
ECHO AV AREA PEAK VELOCITY: 2.4 CM2
ECHO AV AREA VTI: 2.8 CM2
ECHO AV AREA/BSA PEAK VELOCITY: 1.1 CM2/M2
ECHO AV AREA/BSA VTI: 1.3 CM2/M2
ECHO AV MEAN GRADIENT: 5 MMHG
ECHO AV MEAN VELOCITY: 1 M/S
ECHO AV PEAK GRADIENT: 9 MMHG
ECHO AV PEAK VELOCITY: 1.5 M/S
ECHO AV VELOCITY RATIO: 0.6
ECHO AV VTI: 24.2 CM
ECHO BSA: 2.12 M2
ECHO BSA: 2.12 M2
ECHO EST RA PRESSURE: 8 MMHG
ECHO LA DIAMETER INDEX: 2.15 CM/M2
ECHO LA DIAMETER: 4.6 CM
ECHO LA TO AORTIC ROOT RATIO: 1.31
ECHO LV EDV A2C: 74 ML
ECHO LV EDV A4C: 59 ML
ECHO LV EDV INDEX A4C: 28 ML/M2
ECHO LV EDV NDEX A2C: 35 ML/M2
ECHO LV EJECTION FRACTION A2C: 66 %
ECHO LV EJECTION FRACTION A4C: 59 %
ECHO LV EJECTION FRACTION BIPLANE: 63 % (ref 55–100)
ECHO LV ESV A2C: 25 ML
ECHO LV ESV A4C: 24 ML
ECHO LV ESV INDEX A2C: 12 ML/M2
ECHO LV ESV INDEX A4C: 11 ML/M2
ECHO LV FRACTIONAL SHORTENING: 14 % (ref 28–44)
ECHO LV INTERNAL DIMENSION DIASTOLE INDEX: 2.29 CM/M2
ECHO LV INTERNAL DIMENSION DIASTOLIC: 4.9 CM (ref 4.2–5.9)
ECHO LV INTERNAL DIMENSION SYSTOLIC INDEX: 1.96 CM/M2
ECHO LV INTERNAL DIMENSION SYSTOLIC: 4.2 CM
ECHO LV IVSD: 1.1 CM (ref 0.6–1)
ECHO LV MASS 2D: 188.1 G (ref 88–224)
ECHO LV MASS INDEX 2D: 87.9 G/M2 (ref 49–115)
ECHO LV POSTERIOR WALL DIASTOLIC: 1 CM (ref 0.6–1)
ECHO LV RELATIVE WALL THICKNESS RATIO: 0.41
ECHO LVOT AREA: 3.8 CM2
ECHO LVOT AV VTI INDEX: 0.74
ECHO LVOT DIAM: 2.2 CM
ECHO LVOT MEAN GRADIENT: 2 MMHG
ECHO LVOT PEAK GRADIENT: 4 MMHG
ECHO LVOT PEAK VELOCITY: 0.9 M/S
ECHO LVOT STROKE VOLUME INDEX: 31.8 ML/M2
ECHO LVOT SV: 68 ML
ECHO LVOT VTI: 17.9 CM
ECHO MV AREA VTI: 2.5 CM2
ECHO MV E DECELERATION TIME (DT): 167 MS
ECHO MV E VELOCITY: 1.35 M/S
ECHO MV LVOT VTI INDEX: 1.54
ECHO MV MAX VELOCITY: 1.4 M/S
ECHO MV MEAN GRADIENT: 2 MMHG
ECHO MV MEAN VELOCITY: 0.6 M/S
ECHO MV PEAK GRADIENT: 8 MMHG
ECHO MV VTI: 27.6 CM
ECHO RA AREA 4C: 13.2 CM2
ECHO RIGHT VENTRICULAR SYSTOLIC PRESSURE (RVSP): 46 MMHG
ECHO RV TAPSE: 1.3 CM (ref 1.7–?)
ECHO TV REGURGITANT MAX VELOCITY: 3.1 M/S
ECHO TV REGURGITANT PEAK GRADIENT: 38 MMHG
EOSINOPHIL # BLD: 0 K/UL (ref 0–0.4)
EOSINOPHILS RELATIVE PERCENT: 0 % (ref 0–4)
ERYTHROCYTE [DISTWIDTH] IN BLOOD BY AUTOMATED COUNT: 16.3 % (ref 11.5–14.9)
GFR SERPL CREATININE-BSD FRML MDRD: 23 ML/MIN/1.73M2
GLUCOSE BLD-MCNC: 168 MG/DL (ref 75–110)
GLUCOSE BLD-MCNC: 178 MG/DL (ref 75–110)
GLUCOSE BLD-MCNC: 181 MG/DL (ref 75–110)
GLUCOSE SERPL-MCNC: 176 MG/DL (ref 70–99)
HCT VFR BLD AUTO: 30.3 % (ref 41–53)
HGB BLD-MCNC: 9.5 G/DL (ref 13.5–17.5)
INR PPP: 1.3
LACTATE BLDV-SCNC: 1.4 MMOL/L (ref 0.5–2.2)
LYMPHOCYTES NFR BLD: 0.35 K/UL (ref 1–4.8)
LYMPHOCYTES RELATIVE PERCENT: 2 % (ref 24–44)
MCH RBC QN AUTO: 26.2 PG (ref 26–34)
MCHC RBC AUTO-ENTMCNC: 31.2 G/DL (ref 31–37)
MCV RBC AUTO: 83.9 FL (ref 80–100)
MONOCYTES NFR BLD: 0.18 K/UL (ref 0.1–1.3)
MONOCYTES NFR BLD: 1 % (ref 1–7)
MORPHOLOGY: ABNORMAL
NEUTROPHILS NFR BLD: 85 % (ref 36–66)
NEUTS SEG NFR BLD: 14.87 K/UL (ref 1.3–9.1)
PLATELET # BLD AUTO: 435 K/UL (ref 150–450)
PMV BLD AUTO: 6.5 FL (ref 6–12)
POTASSIUM SERPL-SCNC: 4 MMOL/L (ref 3.7–5.3)
PROTHROMBIN TIME: 16.4 SEC (ref 11.8–14.6)
RBC # BLD AUTO: 3.61 M/UL (ref 4.5–5.9)
SODIUM SERPL-SCNC: 144 MMOL/L (ref 135–144)
WBC OTHER # BLD: 17.5 K/UL (ref 3.5–11)

## 2024-03-11 PROCEDURE — 83605 ASSAY OF LACTIC ACID: CPT

## 2024-03-11 PROCEDURE — 82947 ASSAY GLUCOSE BLOOD QUANT: CPT

## 2024-03-11 PROCEDURE — 74018 RADEX ABDOMEN 1 VIEW: CPT

## 2024-03-11 PROCEDURE — 6360000002 HC RX W HCPCS

## 2024-03-11 PROCEDURE — P9047 ALBUMIN (HUMAN), 25%, 50ML: HCPCS | Performed by: INTERNAL MEDICINE

## 2024-03-11 PROCEDURE — 2500000003 HC RX 250 WO HCPCS: Performed by: NURSE PRACTITIONER

## 2024-03-11 PROCEDURE — 99223 1ST HOSP IP/OBS HIGH 75: CPT | Performed by: SURGERY

## 2024-03-11 PROCEDURE — 6360000002 HC RX W HCPCS: Performed by: INTERNAL MEDICINE

## 2024-03-11 PROCEDURE — 2580000003 HC RX 258: Performed by: INTERNAL MEDICINE

## 2024-03-11 PROCEDURE — C9113 INJ PANTOPRAZOLE SODIUM, VIA: HCPCS | Performed by: INTERNAL MEDICINE

## 2024-03-11 PROCEDURE — 2060000000 HC ICU INTERMEDIATE R&B

## 2024-03-11 PROCEDURE — 2580000003 HC RX 258

## 2024-03-11 PROCEDURE — 93306 TTE W/DOPPLER COMPLETE: CPT

## 2024-03-11 PROCEDURE — 99222 1ST HOSP IP/OBS MODERATE 55: CPT | Performed by: NURSE PRACTITIONER

## 2024-03-11 PROCEDURE — 99232 SBSQ HOSP IP/OBS MODERATE 35: CPT | Performed by: INTERNAL MEDICINE

## 2024-03-11 PROCEDURE — 85610 PROTHROMBIN TIME: CPT

## 2024-03-11 PROCEDURE — 85025 COMPLETE CBC W/AUTO DIFF WBC: CPT

## 2024-03-11 PROCEDURE — 6370000000 HC RX 637 (ALT 250 FOR IP): Performed by: INTERNAL MEDICINE

## 2024-03-11 PROCEDURE — 74177 CT ABD & PELVIS W/CONTRAST: CPT

## 2024-03-11 PROCEDURE — C9113 INJ PANTOPRAZOLE SODIUM, VIA: HCPCS

## 2024-03-11 PROCEDURE — 71260 CT THORAX DX C+: CPT

## 2024-03-11 PROCEDURE — 93306 TTE W/DOPPLER COMPLETE: CPT | Performed by: INTERNAL MEDICINE

## 2024-03-11 PROCEDURE — 6360000004 HC RX CONTRAST MEDICATION

## 2024-03-11 PROCEDURE — APPSS30 APP SPLIT SHARED TIME 16-30 MINUTES: Performed by: NURSE PRACTITIONER

## 2024-03-11 PROCEDURE — 99231 SBSQ HOSP IP/OBS SF/LOW 25: CPT | Performed by: INTERNAL MEDICINE

## 2024-03-11 PROCEDURE — 99233 SBSQ HOSP IP/OBS HIGH 50: CPT | Performed by: INTERNAL MEDICINE

## 2024-03-11 PROCEDURE — 80048 BASIC METABOLIC PNL TOTAL CA: CPT

## 2024-03-11 PROCEDURE — 36415 COLL VENOUS BLD VENIPUNCTURE: CPT

## 2024-03-11 PROCEDURE — 93970 EXTREMITY STUDY: CPT | Performed by: SURGERY

## 2024-03-11 PROCEDURE — 93970 EXTREMITY STUDY: CPT

## 2024-03-11 RX ORDER — BISACODYL 10 MG
10 SUPPOSITORY, RECTAL RECTAL ONCE
Status: COMPLETED | OUTPATIENT
Start: 2024-03-11 | End: 2024-03-11

## 2024-03-11 RX ORDER — METOPROLOL TARTRATE 1 MG/ML
5 INJECTION, SOLUTION INTRAVENOUS EVERY 6 HOURS
Status: DISCONTINUED | OUTPATIENT
Start: 2024-03-11 | End: 2024-03-14 | Stop reason: HOSPADM

## 2024-03-11 RX ORDER — 0.9 % SODIUM CHLORIDE 0.9 %
100 INTRAVENOUS SOLUTION INTRAVENOUS ONCE
Status: COMPLETED | OUTPATIENT
Start: 2024-03-11 | End: 2024-03-11

## 2024-03-11 RX ORDER — SODIUM CHLORIDE 0.9 % (FLUSH) 0.9 %
10 SYRINGE (ML) INJECTION ONCE
Status: COMPLETED | OUTPATIENT
Start: 2024-03-11 | End: 2024-03-11

## 2024-03-11 RX ADMIN — PANTOPRAZOLE SODIUM 8 MG/HR: 40 INJECTION, POWDER, FOR SOLUTION INTRAVENOUS at 00:49

## 2024-03-11 RX ADMIN — METOPROLOL TARTRATE 5 MG: 5 INJECTION, SOLUTION INTRAVENOUS at 17:19

## 2024-03-11 RX ADMIN — PANTOPRAZOLE SODIUM 8 MG/HR: 40 INJECTION, POWDER, FOR SOLUTION INTRAVENOUS at 11:29

## 2024-03-11 RX ADMIN — IOPAMIDOL 75 ML: 755 INJECTION, SOLUTION INTRAVENOUS at 03:51

## 2024-03-11 RX ADMIN — PIPERACILLIN AND TAZOBACTAM 3375 MG: 3; .375 INJECTION, POWDER, LYOPHILIZED, FOR SOLUTION INTRAVENOUS at 14:54

## 2024-03-11 RX ADMIN — ONDANSETRON 4 MG: 2 INJECTION INTRAMUSCULAR; INTRAVENOUS at 01:13

## 2024-03-11 RX ADMIN — ALBUMIN (HUMAN) 25 G: 0.25 INJECTION, SOLUTION INTRAVENOUS at 05:12

## 2024-03-11 RX ADMIN — PIPERACILLIN AND TAZOBACTAM 3375 MG: 3; .375 INJECTION, POWDER, LYOPHILIZED, FOR SOLUTION INTRAVENOUS at 06:45

## 2024-03-11 RX ADMIN — Medication 3 MG: at 22:45

## 2024-03-11 RX ADMIN — BISACODYL 10 MG: 10 SUPPOSITORY RECTAL at 08:51

## 2024-03-11 RX ADMIN — SODIUM CHLORIDE 100 ML: 9 INJECTION, SOLUTION INTRAVENOUS at 03:54

## 2024-03-11 RX ADMIN — ALBUMIN (HUMAN) 25 G: 0.25 INJECTION, SOLUTION INTRAVENOUS at 11:30

## 2024-03-11 RX ADMIN — PANTOPRAZOLE SODIUM 8 MG/HR: 40 INJECTION, POWDER, FOR SOLUTION INTRAVENOUS at 22:44

## 2024-03-11 RX ADMIN — SODIUM CHLORIDE: 9 INJECTION, SOLUTION INTRAVENOUS at 23:10

## 2024-03-11 RX ADMIN — SODIUM CHLORIDE, PRESERVATIVE FREE 10 ML: 5 INJECTION INTRAVENOUS at 03:51

## 2024-03-11 RX ADMIN — OCTREOTIDE ACETATE 25 MCG/HR: 500 INJECTION, SOLUTION INTRAVENOUS; SUBCUTANEOUS at 22:44

## 2024-03-11 RX ADMIN — PIPERACILLIN AND TAZOBACTAM 3375 MG: 3; .375 INJECTION, POWDER, LYOPHILIZED, FOR SOLUTION INTRAVENOUS at 23:11

## 2024-03-11 RX ADMIN — ALBUMIN (HUMAN) 25 G: 0.25 INJECTION, SOLUTION INTRAVENOUS at 19:39

## 2024-03-11 ASSESSMENT — ENCOUNTER SYMPTOMS
SORE THROAT: 0
RHINORRHEA: 0
COUGH: 0
SHORTNESS OF BREATH: 0

## 2024-03-11 ASSESSMENT — PAIN SCALES - GENERAL: PAINLEVEL_OUTOF10: 0

## 2024-03-11 NOTE — PLAN OF CARE
Events of this am reviewed, Case d/w dr wilson egd and colonoscopy cancelled today due to SBO and tachycardia, surgery and primary rn notified. Keep npo with ng to liws Stat lactic acid ordered. Recommend stat surgery consult, d/w primary rn who will notifiy attending md. D/w pt and family at bedside. ..Pushpa Pierce, APRN - CNP

## 2024-03-11 NOTE — FLOWSHEET NOTE
03/10/24 2213   Treatment Team Notification   Reason for Communication Review case   Name of Team Member Notified Dr. Zimmerman   Treatment Team Role Consulting Provider   Method of Communication Secure Message   Response Waiting for response   Notification Time 2213   Deterioration Index RN Interventions Performed  Charge RN Notified     Pt refusing to drink rest of bowel prep solution. Pt has finished about 1/2 of container so far. Pt has had 0 Bms today. Dr. Zimmerman updated. No new orders received at this time.

## 2024-03-11 NOTE — CONSULTS
[facility]    GASTROENTEROLOGY CONSULT    Patient:   Octavio Slaughter   :    1940   Facility:   [facility]  Date:    3/9/2024  Admission Dx:  Abdominal pain [R10.9]  Hematemesis with nausea [K92.0]  Requesting physician: Lavern Childers MD  Reason for consult:  Colon mass      SUBJECTIVE:  History of Present Illness:  This is a 83 y.o.   male who was admitted 3/8/2024 with Abdominal pain [R10.9]  Hematemesis with nausea [K92.0]. We have been asked to see the patient in consultation by Lavern Childers MD for  colon mass. This is a 83 year old male with pmh of nothing-pt has not seen a MD since  who presented to the ED for hematemesis. He states he had several episodes of coffee ground emesis in the last several weeks. The last episode was was two days ago. He reports increased abdominal distention in the last month with edema to both lower legs. He has had decreased oral intake and a weight loss of 30# in the last several months with fatigue. He has had constipation but has not had melena or hematochezia. He takes 1-2 motrin per day for back pain but is not taking any anticoagulation medications. He denies prior gi bleeding. He quit drinking alcohol many years ago. Ct abd shows esophageal wall thickening, several liver masses with gallbladder wall thickening, apple core lesion in the colon ascites peritoneal implants and possible L4 lytic lesion. Labs show creat 2.3 wbc 14.7 hgb 10.3 plt 545 alk phos 180 inr not done. He denies fevers chills dysphagia rash.     ENDOSCOPY HX none  FAMILY HX no hx of liver pancreatic stomach or colon cancer     OBJECTIVE:    PAST MEDICAL/SURGICAL HISTORY  History reviewed. No pertinent past medical history.  History reviewed. No pertinent surgical history.    ALLERGIES:  No Known Allergies    HOME MEDICATIONS:  Prior to Admission medications    Medication Sig Start Date End Date Taking? Authorizing Provider   ibuprofen (ADVIL;MOTRIN) 
    Palliative Care Inpatient Consult    NAME:  Octavio Slaughter  MEDICAL RECORD NUMBER:  [id number]  AGE: 83 y.o.   GENDER: male  : 1940  TODAY'S DATE:  3/11/2024    Reasons for Consultation:    Symptom and/or pain management  Provision of information regarding PC and/or hospice philosophies  Complex, time-intensive communication and interdisciplinary psychosocial support  Clarification of goals of care and/or assistance with difficult decision-making  Guidance in regards to resources and transition(s)    Members of PC team contributing to this consultation are : Ina Serrato, Palliative Care APRN-CNP    Plan      Palliative Interaction: I went to see patient and he was lying in bed awake with 2 daughters and other family member at bedside. I explained my role to them.     I discussed patients hospitalized problems in detail. He and family are aware that metastatic colon cancer is suspected. Scopes were canceled today d/t tachycardia and SBO. He has NG to LIWS now. He is on 7LNC. His abdomen is distended and paracentesis has been ordered. He reports that he had back pain for some time and was taking Ibuprofen for his pain. He doesn't go to the doctors.     I discussed goals and patient reports that he wants to find out what this is so is agreeable to Bx. We discussed how this might be possible with paracentesis and cytology rather than scope but defer to specialist. He reports that he does not want any cancer treatment. He reports that he just wants to be comfortable and go home to see his cat.     I discussed his code status DNRCC-A no intubation and explained difference in DNRCC. He tells me if SBO is relieved then he will do Bx but then wishes to go home on hospice care. He would like to stay DNRCC-A no intubation until this is done then wants to be DNRCC and go home.     I discussed hospice care and family has used [employer] so he reports wanting this company. He reports being agreeable to meet with them. We 
  NEPHROLOGY CONSULT     Patient :  Octavio Slaughter; 83 y.o. MRN# [id number]  Location:  2097/2097-01  Attending:  Lavern Childers MD  Admit Date:  3/8/2024   Hospital Day: 1      Reason for Consult: Acute kidney injury      Chief Complaint: Vomiting  History Obtained From:  patient    History of Present Illness:    This is a 83 y.o. male with no known past medical history, patient has not seen a physician in years, patient presented to the hospital with complaints of vomiting that started few days back, patient noted coffee-ground emesis.   patient also complaining of fatigue weakness and weight loss about 30 pounds of weight loss patient denied any abdominal pain no headache dizziness.  Patient had a CT abdomen and pelvis with IV contrast which showed sigmoid colon mass lesion presumably reflecting colon cancer with hepatic metastasis lesions.  Ascites with multifocal peritoneal soft tissue density lesions keeping with peritoneal implants presumed carcinomatosis and malignant ascites, infiltrative mixed lytic and sclerotic lesion at L4 concerning for possible metastatic lesion.  Labs on admission showed serum creatinine of 2.1 mg/dL which has increased to 2.3 mg/dL and there is no previous records or any labs to compare his kidney function.  Patient did not have any blood work in recent years.      Pt takes NSAID 2 tablet daily.Patient denies dysuria, gross hematuria, flank pain, nocturia, urgency, passing frothy urine or urinary incontinence.   There is no history  of paraprotein disease.  Pt denies any history of recurrent UTI or kidney stones.  Medication review shows use of ACE-inhibitor/diuretics.    Past Medical History:    History reviewed. No pertinent past medical history.    Past Surgical History:    History reviewed. No pertinent surgical history.    Current Medications:    furosemide (LASIX) injection 20 mg, Daily  octreotide (SANDOSTATIN) 500 mcg in sodium chloride 0.9 % 100 mL infusion, Continuous  sodium 
PLease see consult note from today. Thanks Ina Serrato Palliative CNP  
  GLUCOSE 210*     BNP: No results for input(s): \"BNP\" in the last 72 hours.  PT/INR: No results for input(s): \"PROTIME\", \"INR\" in the last 72 hours.  APTT:No results for input(s): \"APTT\" in the last 72 hours.  CARDIAC ENZYMES:No results for input(s): \"CKTOTAL\", \"CKMB\", \"CKMBINDEX\", \"TROPONINI\" in the last 72 hours.  FASTING LIPID PANEL:No results found for: \"HDL\", \"LDLDIRECT\", \"LDLCALC\", \"TRIG\"  LIVER PROFILE:  Recent Labs     03/08/24  1422   AST 18   ALT 15   LABALBU 3.5       Intake/Output Summary (Last 24 hours) at 3/9/2024 0817  Last data filed at 3/8/2024 1552  Gross per 24 hour   Intake 1050 ml   Output --   Net 1050 ml       IMPRESSION:    Patient Active Problem List   Diagnosis    Abdominal pain           RECOMMENDATIONS:  Elevated tropes flat possible type II, no chest pain no symptoms  EKG, sinus tachycardia no acute ST-T changes  Hematemesis abdominal distention CAT scan with the mass and mets to liver and lytic lesion to lumbar area along with ascites  GI is on board  We will not start on coagulation at this time  Will get echocardiogram on Monday  Patient is DNR CCA  Discussed with daughter and niece      Discussed with patient and family and nursing.    Jae Walter MD, MD [facility]        [phone]       
    03/11/2024 08:09 AM    K 4.0 03/11/2024 08:09 AM     03/11/2024 08:09 AM    CO2 24 03/11/2024 08:09 AM    BUN 36 03/11/2024 08:09 AM    CREATININE 2.7 03/11/2024 08:09 AM    GLUCOSE 176 03/11/2024 08:09 AM    CALCIUM 9.1 03/11/2024 08:09 AM       LFTS  Lab Results   Component Value Date/Time    ALKPHOS 153 03/10/2024 05:27 AM    ALT 9 03/10/2024 05:27 AM    AST 9 03/10/2024 05:27 AM    PROT 6.1 03/10/2024 05:27 AM    BILITOT 0.4 03/10/2024 05:27 AM    LABALBU 2.9 03/10/2024 05:27 AM       INR  Recent Labs     03/11/24  0809   PROTIME 16.4*   INR 1.3       APTT  No results for input(s): \"APTT\" in the last 72 hours.    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   Bilateral pleural effusions with bibasilar consolidation.  GI bleed with hematemesis  Acute esophagitis  Ascites  Small bowel obstruction  GILBERTO  Unintentional weight loss of 30 lbs over a 1 month period of time  Elevated troponins  Sinus tachyarrhythmia  Leukocytosis  Received original COVID-19 series, no other vaccinations per family.  DNRCC-A      Plan:    Poor prognosis per oncology, general surgery consulted for management of small bowel obstruction, awaiting their recommendations and patient/family decision as to whether or not to proceed with surgery.   From a pulmonary standpoint will abide by patient and family's wishes for comfort over more invasive treatment, anticipate transition to DNRCC, palliative care on board.   Octreotide and protonix gtt.  May require therapeutic paracentesis, high suspicion of malignant ascites.   GILBERTO management per nephrology. Currently on 0.9 NS at 75 ml/hr.   Echo 
cancer based on the imaging studies.  I offered soapsuds enemas with sigmoidoscopy with possible biopsy of the sigmoid mass versus interventional radiology guided liver biopsy to prove evidence of malignancy.  Pros and cons of each option discussed with them at length.  Patient has a meeting scheduled tomorrow afternoon with hospice.  Patient wants to explore his palliative hospice options first before making any decision in terms of aggressive intervention.  Medical oncology on the case.  They can discuss prognosis with the patient as well.  Awaiting patient's decision regarding further intervention as stated above.  Continue bowel rest.  IV fluids.  NG tube to low remittent suction.  IV antibiotics.  Conservative management.  Recheck labs in the morning.  Reevaluate tomorrow.  Overall prognosis poor.    Thank you for this interesting consult and for allowing us to participate in the care of this patient. If you have any questions please don't hesitate to call.    The patient, Octavio Slaughter is a 83 y.o. male, was seen with a total time spent of 60 minutes for the visit on this date of service by the E/M provider. The time component had both face to face and non face to face time spent in determining the total time component.  Counseling and education regarding the patient's diagnosis listed below and the patient's options regarding those diagnoses were also included in determining the time component.      Electronically signed by Adarsh Perla MD  on 3/11/2024 at 6:34 PM   
extrapolated by contextual diversion.

## 2024-03-12 LAB
ANA SER QL IA: NEGATIVE
ANION GAP SERPL CALCULATED.3IONS-SCNC: 16 MMOL/L (ref 9–17)
BASOPHILS # BLD: 0 K/UL (ref 0–0.2)
BASOPHILS NFR BLD: 0 % (ref 0–2)
BUN SERPL-MCNC: 38 MG/DL (ref 8–23)
CALCIUM SERPL-MCNC: 9.1 MG/DL (ref 8.6–10.4)
CHLORIDE SERPL-SCNC: 109 MMOL/L (ref 98–107)
CO2 SERPL-SCNC: 23 MMOL/L (ref 20–31)
CREAT SERPL-MCNC: 2.8 MG/DL (ref 0.7–1.2)
DSDNA IGG SER QL IA: 2.9 IU/ML
EKG ATRIAL RATE: 136 BPM
EKG Q-T INTERVAL: 348 MS
EKG QRS DURATION: 80 MS
EKG QTC CALCULATION (BAZETT): 494 MS
EKG R AXIS: -19 DEGREES
EKG T AXIS: 11 DEGREES
EKG VENTRICULAR RATE: 121 BPM
EOSINOPHIL # BLD: 0 K/UL (ref 0–0.4)
EOSINOPHILS RELATIVE PERCENT: 0 % (ref 0–4)
ERYTHROCYTE [DISTWIDTH] IN BLOOD BY AUTOMATED COUNT: 17.5 % (ref 11.5–14.9)
GFR SERPL CREATININE-BSD FRML MDRD: 22 ML/MIN/1.73M2
GLUCOSE SERPL-MCNC: 155 MG/DL (ref 70–99)
HCT VFR BLD AUTO: 27 % (ref 41–53)
HGB BLD-MCNC: 8 G/DL (ref 13.5–17.5)
LYMPHOCYTES NFR BLD: 0.57 K/UL (ref 1–4.8)
LYMPHOCYTES RELATIVE PERCENT: 5 % (ref 24–44)
MCH RBC QN AUTO: 25.9 PG (ref 26–34)
MCHC RBC AUTO-ENTMCNC: 29.8 G/DL (ref 31–37)
MCV RBC AUTO: 86.9 FL (ref 80–100)
MITOCHONDRIA M2 IGG SER-ACNC: 0.5 U/ML (ref 0–4)
MONOCYTES NFR BLD: 0 % (ref 1–7)
MONOCYTES NFR BLD: 0 K/UL (ref 0.1–1.3)
MORPHOLOGY: ABNORMAL
NEUTROPHILS NFR BLD: 95 % (ref 36–66)
NEUTS SEG NFR BLD: 10.83 K/UL (ref 1.3–9.1)
NUCLEAR IGG SER IA-RTO: 0.1 U/ML
PLATELET # BLD AUTO: 381 K/UL (ref 150–450)
PMV BLD AUTO: 6.4 FL (ref 6–12)
POTASSIUM SERPL-SCNC: 3.9 MMOL/L (ref 3.7–5.3)
RBC # BLD AUTO: 3.11 M/UL (ref 4.5–5.9)
SODIUM SERPL-SCNC: 148 MMOL/L (ref 135–144)
WBC OTHER # BLD: 11.4 K/UL (ref 3.5–11)

## 2024-03-12 PROCEDURE — C9113 INJ PANTOPRAZOLE SODIUM, VIA: HCPCS

## 2024-03-12 PROCEDURE — 1200000000 HC SEMI PRIVATE

## 2024-03-12 PROCEDURE — 99232 SBSQ HOSP IP/OBS MODERATE 35: CPT | Performed by: INTERNAL MEDICINE

## 2024-03-12 PROCEDURE — 2580000003 HC RX 258: Performed by: INTERNAL MEDICINE

## 2024-03-12 PROCEDURE — 6360000002 HC RX W HCPCS: Performed by: INTERNAL MEDICINE

## 2024-03-12 PROCEDURE — P9047 ALBUMIN (HUMAN), 25%, 50ML: HCPCS | Performed by: INTERNAL MEDICINE

## 2024-03-12 PROCEDURE — 99222 1ST HOSP IP/OBS MODERATE 55: CPT | Performed by: NURSE PRACTITIONER

## 2024-03-12 PROCEDURE — 94761 N-INVAS EAR/PLS OXIMETRY MLT: CPT

## 2024-03-12 PROCEDURE — 99233 SBSQ HOSP IP/OBS HIGH 50: CPT | Performed by: INTERNAL MEDICINE

## 2024-03-12 PROCEDURE — 93010 ELECTROCARDIOGRAM REPORT: CPT | Performed by: INTERNAL MEDICINE

## 2024-03-12 PROCEDURE — 6360000002 HC RX W HCPCS

## 2024-03-12 PROCEDURE — 2500000003 HC RX 250 WO HCPCS: Performed by: NURSE PRACTITIONER

## 2024-03-12 PROCEDURE — 2580000003 HC RX 258

## 2024-03-12 PROCEDURE — 80048 BASIC METABOLIC PNL TOTAL CA: CPT

## 2024-03-12 PROCEDURE — 36415 COLL VENOUS BLD VENIPUNCTURE: CPT

## 2024-03-12 PROCEDURE — 85025 COMPLETE CBC W/AUTO DIFF WBC: CPT

## 2024-03-12 PROCEDURE — 2700000000 HC OXYGEN THERAPY PER DAY

## 2024-03-12 PROCEDURE — 99231 SBSQ HOSP IP/OBS SF/LOW 25: CPT | Performed by: INTERNAL MEDICINE

## 2024-03-12 RX ORDER — SODIUM CHLORIDE 450 MG/100ML
INJECTION, SOLUTION INTRAVENOUS CONTINUOUS
Status: DISCONTINUED | OUTPATIENT
Start: 2024-03-12 | End: 2024-03-14 | Stop reason: HOSPADM

## 2024-03-12 RX ORDER — LORAZEPAM 2 MG/ML
0.5 INJECTION INTRAMUSCULAR EVERY 4 HOURS PRN
Status: DISCONTINUED | OUTPATIENT
Start: 2024-03-12 | End: 2024-03-14 | Stop reason: HOSPADM

## 2024-03-12 RX ORDER — MORPHINE SULFATE 2 MG/ML
2 INJECTION, SOLUTION INTRAMUSCULAR; INTRAVENOUS EVERY 4 HOURS PRN
Status: DISCONTINUED | OUTPATIENT
Start: 2024-03-12 | End: 2024-03-14 | Stop reason: HOSPADM

## 2024-03-12 RX ADMIN — PANTOPRAZOLE SODIUM 8 MG/HR: 40 INJECTION, POWDER, FOR SOLUTION INTRAVENOUS at 05:33

## 2024-03-12 RX ADMIN — PIPERACILLIN AND TAZOBACTAM 3375 MG: 3; .375 INJECTION, POWDER, LYOPHILIZED, FOR SOLUTION INTRAVENOUS at 15:34

## 2024-03-12 RX ADMIN — Medication 10 ML: at 07:46

## 2024-03-12 RX ADMIN — Medication 10 ML: at 20:20

## 2024-03-12 RX ADMIN — PIPERACILLIN AND TAZOBACTAM 3375 MG: 3; .375 INJECTION, POWDER, LYOPHILIZED, FOR SOLUTION INTRAVENOUS at 23:26

## 2024-03-12 RX ADMIN — ALBUMIN (HUMAN) 25 G: 0.25 INJECTION, SOLUTION INTRAVENOUS at 04:29

## 2024-03-12 RX ADMIN — SODIUM CHLORIDE: 4.5 INJECTION, SOLUTION INTRAVENOUS at 19:04

## 2024-03-12 RX ADMIN — ALBUMIN (HUMAN) 25 G: 0.25 INJECTION, SOLUTION INTRAVENOUS at 13:00

## 2024-03-12 RX ADMIN — SODIUM CHLORIDE: 4.5 INJECTION, SOLUTION INTRAVENOUS at 07:45

## 2024-03-12 RX ADMIN — PIPERACILLIN AND TAZOBACTAM 3375 MG: 3; .375 INJECTION, POWDER, LYOPHILIZED, FOR SOLUTION INTRAVENOUS at 08:06

## 2024-03-12 RX ADMIN — MORPHINE SULFATE 2 MG: 2 INJECTION, SOLUTION INTRAMUSCULAR; INTRAVENOUS at 12:02

## 2024-03-12 RX ADMIN — ALBUMIN (HUMAN) 25 G: 0.25 INJECTION, SOLUTION INTRAVENOUS at 20:19

## 2024-03-12 RX ADMIN — METOPROLOL TARTRATE 5 MG: 5 INJECTION, SOLUTION INTRAVENOUS at 23:19

## 2024-03-12 ASSESSMENT — PAIN SCALES - GENERAL
PAINLEVEL_OUTOF10: 0
PAINLEVEL_OUTOF10: 7

## 2024-03-12 ASSESSMENT — ENCOUNTER SYMPTOMS
SORE THROAT: 0
COUGH: 0
RHINORRHEA: 0
SHORTNESS OF BREATH: 0

## 2024-03-12 ASSESSMENT — PAIN DESCRIPTION - LOCATION: LOCATION: ABDOMEN

## 2024-03-12 ASSESSMENT — PAIN DESCRIPTION - FREQUENCY: FREQUENCY: CONTINUOUS

## 2024-03-12 ASSESSMENT — PAIN DESCRIPTION - ONSET: ONSET: SUDDEN

## 2024-03-12 ASSESSMENT — PAIN DESCRIPTION - PAIN TYPE: TYPE: ACUTE PAIN

## 2024-03-12 ASSESSMENT — PAIN - FUNCTIONAL ASSESSMENT: PAIN_FUNCTIONAL_ASSESSMENT: ACTIVITIES ARE NOT PREVENTED

## 2024-03-12 ASSESSMENT — PAIN DESCRIPTION - DESCRIPTORS: DESCRIPTORS: SORE

## 2024-03-12 NOTE — PLAN OF CARE
Problem: Safety - Adult  Goal: Free from fall injury  Outcome: Progressing     Problem: ABCDS Injury Assessment  Goal: Absence of physical injury  Outcome: Progressing     Problem: Skin/Tissue Integrity  Goal: Absence of new skin breakdown  Description: 1.  Monitor for areas of redness and/or skin breakdown  2.  Assess vascular access sites hourly  3.  Every 4-6 hours minimum:  Change oxygen saturation probe site  4.  Every 4-6 hours:  If on nasal continuous positive airway pressure, respiratory therapy assess nares and determine need for appliance change or resting period.  Outcome: Progressing     Problem: Pain  Goal: Verbalizes/displays adequate comfort level or baseline comfort level  Outcome: Progressing

## 2024-03-12 NOTE — PLAN OF CARE
Problem: Discharge Planning  Goal: Discharge to home or other facility with appropriate resources  Outcome: Progressing  Flowsheets (Taken 3/11/2024 2000)  Discharge to home or other facility with appropriate resources:   Identify barriers to discharge with patient and caregiver   Arrange for needed discharge resources and transportation as appropriate   Identify discharge learning needs (meds, wound care, etc)   Arrange for interpreters to assist at discharge as needed   Refer to discharge planning if patient needs post-hospital services based on physician order or complex needs related to functional status, cognitive ability or social support system     Problem: Safety - Adult  Goal: Free from fall injury  Outcome: Progressing  Flowsheets (Taken 3/11/2024 2325)  Free From Fall Injury: Instruct family/caregiver on patient safety     Problem: ABCDS Injury Assessment  Goal: Absence of physical injury  Outcome: Progressing  Flowsheets (Taken 3/11/2024 2325)  Absence of Physical Injury: Implement safety measures based on patient assessment     Problem: Skin/Tissue Integrity  Goal: Absence of new skin breakdown  Description: 1.  Monitor for areas of redness and/or skin breakdown  2.  Assess vascular access sites hourly  Outcome: Progressing     Problem: Pain  Goal: Verbalizes/displays adequate comfort level or baseline comfort level  Outcome: Progressing  Flowsheets (Taken 3/11/2024 2000)  Verbalizes/displays adequate comfort level or baseline comfort level:   Encourage patient to monitor pain and request assistance   Assess pain using appropriate pain scale   Administer analgesics based on type and severity of pain and evaluate response   Implement non-pharmacological measures as appropriate and evaluate response   Consider cultural and social influences on pain and pain management   Notify Licensed Independent Practitioner if interventions unsuccessful or patient reports new pain

## 2024-03-13 ENCOUNTER — APPOINTMENT (OUTPATIENT)
Dept: INTERVENTIONAL RADIOLOGY/VASCULAR | Age: 84
DRG: 374 | End: 2024-03-13
Payer: COMMERCIAL

## 2024-03-13 LAB
ALBUMIN FLD-MCNC: 2.8 G/DL
ANION GAP SERPL CALCULATED.3IONS-SCNC: 13 MMOL/L (ref 9–17)
APPEARANCE FLD: ABNORMAL
BASOPHILS # BLD: 0 K/UL (ref 0–0.2)
BASOPHILS NFR BLD: 0 % (ref 0–2)
BLASTS NFR FLD: ABNORMAL %
BODY FLD TYPE: ABNORMAL
BUN SERPL-MCNC: 45 MG/DL (ref 8–23)
CALCIUM SERPL-MCNC: 9.2 MG/DL (ref 8.6–10.4)
CHLORIDE SERPL-SCNC: 108 MMOL/L (ref 98–107)
CO2 SERPL-SCNC: 25 MMOL/L (ref 20–31)
COLOR FLD: YELLOW
CREAT SERPL-MCNC: 3.5 MG/DL (ref 0.7–1.2)
EOSINOPHIL # BLD: 0.12 K/UL (ref 0–0.4)
EOSINOPHIL NFR FLD: 2 %
EOSINOPHILS RELATIVE PERCENT: 1 % (ref 0–4)
ERYTHROCYTE [DISTWIDTH] IN BLOOD BY AUTOMATED COUNT: 17.4 % (ref 11.5–14.9)
GFR SERPL CREATININE-BSD FRML MDRD: 17 ML/MIN/1.73M2
GLUCOSE SERPL-MCNC: 210 MG/DL (ref 70–99)
HCT VFR BLD AUTO: 27.7 % (ref 41–53)
HGB BLD-MCNC: 8.5 G/DL (ref 13.5–17.5)
LYMPHOCYTES NFR BLD: 0.74 K/UL (ref 1–4.8)
LYMPHOCYTES NFR FLD: 54 %
LYMPHOCYTES RELATIVE PERCENT: 6 % (ref 24–44)
MCH RBC QN AUTO: 25.9 PG (ref 26–34)
MCHC RBC AUTO-ENTMCNC: 30.7 G/DL (ref 31–37)
MCV RBC AUTO: 84.5 FL (ref 80–100)
MICROORGANISM SPEC CULT: NORMAL
MICROORGANISM SPEC CULT: NORMAL
MONOCYTES NFR BLD: 0.62 K/UL (ref 0.1–1.3)
MONOCYTES NFR BLD: 5 % (ref 1–7)
MONOCYTES NFR FLD: 30 %
MORPHOLOGY: ABNORMAL
NEUTROPHILS NFR BLD: 88 % (ref 36–66)
NEUTROPHILS NFR FLD: 14 %
NEUTS SEG NFR BLD: 10.92 K/UL (ref 1.3–9.1)
PLATELET # BLD AUTO: 405 K/UL (ref 150–450)
PMV BLD AUTO: 6.7 FL (ref 6–12)
POTASSIUM SERPL-SCNC: 3.6 MMOL/L (ref 3.7–5.3)
PROT FLD-MCNC: 4.5 G/DL
RBC # BLD AUTO: 3.28 M/UL (ref 4.5–5.9)
RBC # FLD: ABNORMAL CELLS/UL
SERVICE CMNT-IMP: NORMAL
SERVICE CMNT-IMP: NORMAL
SODIUM SERPL-SCNC: 146 MMOL/L (ref 135–144)
SPECIMEN DESCRIPTION: NORMAL
SPECIMEN DESCRIPTION: NORMAL
SPECIMEN TYPE: NORMAL
SPECIMEN TYPE: NORMAL
UNIDENT CELLS NFR FLD: ABNORMAL %
WBC # FLD: 724 CELLS/UL
WBC OTHER # BLD: 12.4 K/UL (ref 3.5–11)

## 2024-03-13 PROCEDURE — 88341 IMHCHEM/IMCYTCHM EA ADD ANTB: CPT

## 2024-03-13 PROCEDURE — 36415 COLL VENOUS BLD VENIPUNCTURE: CPT

## 2024-03-13 PROCEDURE — 85025 COMPLETE CBC W/AUTO DIFF WBC: CPT

## 2024-03-13 PROCEDURE — 99232 SBSQ HOSP IP/OBS MODERATE 35: CPT | Performed by: SURGERY

## 2024-03-13 PROCEDURE — 2500000003 HC RX 250 WO HCPCS: Performed by: NURSE PRACTITIONER

## 2024-03-13 PROCEDURE — 88305 TISSUE EXAM BY PATHOLOGIST: CPT

## 2024-03-13 PROCEDURE — 88342 IMHCHEM/IMCYTCHM 1ST ANTB: CPT

## 2024-03-13 PROCEDURE — 87075 CULTR BACTERIA EXCEPT BLOOD: CPT

## 2024-03-13 PROCEDURE — 87070 CULTURE OTHR SPECIMN AEROBIC: CPT

## 2024-03-13 PROCEDURE — 49083 ABD PARACENTESIS W/IMAGING: CPT

## 2024-03-13 PROCEDURE — 84157 ASSAY OF PROTEIN OTHER: CPT

## 2024-03-13 PROCEDURE — 99233 SBSQ HOSP IP/OBS HIGH 50: CPT | Performed by: INTERNAL MEDICINE

## 2024-03-13 PROCEDURE — 89051 BODY FLUID CELL COUNT: CPT

## 2024-03-13 PROCEDURE — 1200000000 HC SEMI PRIVATE

## 2024-03-13 PROCEDURE — 87205 SMEAR GRAM STAIN: CPT

## 2024-03-13 PROCEDURE — 2580000003 HC RX 258: Performed by: INTERNAL MEDICINE

## 2024-03-13 PROCEDURE — 80048 BASIC METABOLIC PNL TOTAL CA: CPT

## 2024-03-13 PROCEDURE — P9047 ALBUMIN (HUMAN), 25%, 50ML: HCPCS | Performed by: INTERNAL MEDICINE

## 2024-03-13 PROCEDURE — 6360000002 HC RX W HCPCS: Performed by: INTERNAL MEDICINE

## 2024-03-13 PROCEDURE — 49418 INSERT TUN IP CATH PERC: CPT

## 2024-03-13 PROCEDURE — 82042 OTHER SOURCE ALBUMIN QUAN EA: CPT

## 2024-03-13 PROCEDURE — 99232 SBSQ HOSP IP/OBS MODERATE 35: CPT | Performed by: INTERNAL MEDICINE

## 2024-03-13 PROCEDURE — 88112 CYTOPATH CELL ENHANCE TECH: CPT

## 2024-03-13 PROCEDURE — 6370000000 HC RX 637 (ALT 250 FOR IP): Performed by: INTERNAL MEDICINE

## 2024-03-13 RX ADMIN — METOPROLOL TARTRATE 5 MG: 5 INJECTION, SOLUTION INTRAVENOUS at 17:05

## 2024-03-13 RX ADMIN — Medication 10 ML: at 08:36

## 2024-03-13 RX ADMIN — ALBUMIN (HUMAN) 25 G: 0.25 INJECTION, SOLUTION INTRAVENOUS at 04:40

## 2024-03-13 RX ADMIN — Medication 3 MG: at 19:59

## 2024-03-13 RX ADMIN — PIPERACILLIN AND TAZOBACTAM 3375 MG: 3; .375 INJECTION, POWDER, LYOPHILIZED, FOR SOLUTION INTRAVENOUS at 15:25

## 2024-03-13 RX ADMIN — ALBUMIN (HUMAN) 25 G: 0.25 INJECTION, SOLUTION INTRAVENOUS at 20:00

## 2024-03-13 RX ADMIN — PIPERACILLIN AND TAZOBACTAM 3375 MG: 3; .375 INJECTION, POWDER, LYOPHILIZED, FOR SOLUTION INTRAVENOUS at 06:33

## 2024-03-13 RX ADMIN — METOPROLOL TARTRATE 5 MG: 5 INJECTION, SOLUTION INTRAVENOUS at 04:32

## 2024-03-13 ASSESSMENT — PAIN SCALES - GENERAL
PAINLEVEL_OUTOF10: 0
PAINLEVEL_OUTOF10: 0

## 2024-03-13 ASSESSMENT — ENCOUNTER SYMPTOMS
RHINORRHEA: 0
COUGH: 0
SHORTNESS OF BREATH: 0
SORE THROAT: 0

## 2024-03-13 NOTE — BRIEF OP NOTE
Brief Postoperative Note    Octavio Slaughter  YOB: 1940  [id number]    Pre-operative Diagnosis: Malignant ascites    Post-operative Diagnosis: Same    Procedure: Peritoneal drain placement    Medications Given: none    Anesthesia: Local    Surgeons/Assistants: RACHNA ARECHIGA MD    Estimated Blood Loss: minimal    Complications: none    Specimens: were obtained    Findings: 15.5 F Aspira peritoneal drain inserted on the right; 4200 ml aniyah colored fluid removed. Sample provided for further requested studies. Pt tolerated procedure well. Instructions for use provided.      Electronically signed by RACHNA ARECHIGA MD on 3/13/2024 at 4:53 PM

## 2024-03-13 NOTE — PLAN OF CARE
Problem: Safety - Adult  Goal: Free from fall injury  3/13/2024 0454 by Loren Daily, RN  Outcome: Progressing, Patient remains free of incidence/ injury. Bed remains in low position. Side rails up x2.        Problem: Skin/Tissue Integrity  Goal: Absence of new skin breakdown  Description: 1.  Monitor for areas of redness and/or skin breakdown  2.  Assess vascular access sites hourly  3.  Every 4-6 hours minimum:  Change oxygen saturation probe site  4.  Every 4-6 hours:  If on nasal continuous positive airway pressure, respiratory therapy assess nares and determine need for appliance change or resting period.  3/13/2024 0454 by Loren Daily, RN  Outcome: Progressing, No new occurrence of skin breakdown noted during this shift.

## 2024-03-13 NOTE — OR NURSING
Alert and oriented. US in use. Rt abd prepped draped. Numbed and accessed by Dr Napier Obtained 4.2 L of clear  aniyah fluid. Access removed, peritoneal drain remains in place.  Dressing applied. Tolerated well Report called to Charline GIMENEZ Specimen to lab

## 2024-03-13 NOTE — PLAN OF CARE
Problem: Discharge Planning  Goal: Discharge to home or other facility with appropriate resources  Outcome: Progressing     Problem: Safety - Adult  Goal: Free from fall injury  Outcome: Progressing     Problem: ABCDS Injury Assessment  Goal: Absence of physical injury  Outcome: Progressing     Problem: Skin/Tissue Integrity  Goal: Absence of new skin breakdown  Description: 1.  Monitor for areas of redness and/or skin breakdown  2.  Assess vascular access sites hourly  3.  Every 4-6 hours minimum:  Change oxygen saturation probe site  4.  Every 4-6 hours:  If on nasal continuous positive airway pressure, respiratory therapy assess nares and determine need for appliance change or resting period.  Outcome: Progressing     Problem: Pain  Goal: Verbalizes/displays adequate comfort level or baseline comfort level  Outcome: Progressing

## 2024-03-13 NOTE — DISCHARGE INSTR - COC
Continuity of Care Form    Patient Name: Octavio Slaughter   :  1940  MRN:  [id number]    Admit date:  3/8/2024  Discharge date:  3/14/24    Code Status Order: DNR-CC   Advance Directives:     Admitting Physician:  Lavern Childers MD  PCP: No primary care provider on file.    Discharging Nurse: PERNELL Bennett  Discharging Hospital Unit/Room#: 2074/2074-01  Discharging Unit Phone Number: [phone]    Emergency Contact:   Extended Emergency Contact Information  Primary Emergency Contact: Liberty Slaughter  Home Phone: [phone]  Relation: Child  Secondary Emergency Contact: Maggy Lizarraga  Mobile Phone: [phone]  Relation: Niece/Nephew    Past Surgical History:  History reviewed. No pertinent surgical history.    Immunization History:     There is no immunization history on file for this patient.    Active Problems:  Patient Active Problem List   Diagnosis Code    Abdominal pain R10.9    Hematemesis with nausea K92.0    Troponin level elevated R79.89    Tachycardia R00.0    PVC (premature ventricular contraction) I49.3    Edema R60.9    Colonic mass K63.89    Liver lesion K76.9    Weight loss R63.4    Goals of care, counseling/discussion Z71.89    Acute respiratory failure (HCC) J96.00    DNR (do not resuscitate) discussion Z71.89    ACP (advance care planning) Z71.89    Palliative care encounter Z51.5    Abdominal distention R14.0    SBO (small bowel obstruction) (HCC) K56.609       Isolation/Infection:   Isolation            No Isolation          Patient Infection Status       None to display            Nurse Assessment:  Last Vital Signs: /65   Pulse 86   Temp 97.6 °F (36.4 °C) (Oral)   Resp 26 Comment: RN aware  Ht 1.854 m (6' 0.99\")   Wt 92.4 kg (203 lb 11.3 oz)   SpO2 96%   BMI 26.88 kg/m²     Last documented pain score (0-10 scale): Pain Level: 0  Last Weight:   Wt Readings from Last 1 Encounters:   24 92.4 kg (203 lb 11.3 oz)     Mental Status:  oriented, alert, and confusion at

## 2024-03-14 VITALS
RESPIRATION RATE: 28 BRPM | SYSTOLIC BLOOD PRESSURE: 120 MMHG | WEIGHT: 203.71 LBS | HEIGHT: 73 IN | OXYGEN SATURATION: 99 % | BODY MASS INDEX: 27 KG/M2 | TEMPERATURE: 97.5 F | DIASTOLIC BLOOD PRESSURE: 61 MMHG | HEART RATE: 95 BPM

## 2024-03-14 PROCEDURE — 99239 HOSP IP/OBS DSCHRG MGMT >30: CPT | Performed by: INTERNAL MEDICINE

## 2024-03-14 PROCEDURE — 2500000003 HC RX 250 WO HCPCS: Performed by: NURSE PRACTITIONER

## 2024-03-14 PROCEDURE — 2580000003 HC RX 258: Performed by: SURGERY

## 2024-03-14 PROCEDURE — 6360000002 HC RX W HCPCS: Performed by: SURGERY

## 2024-03-14 PROCEDURE — P9047 ALBUMIN (HUMAN), 25%, 50ML: HCPCS | Performed by: INTERNAL MEDICINE

## 2024-03-14 PROCEDURE — 99232 SBSQ HOSP IP/OBS MODERATE 35: CPT | Performed by: INTERNAL MEDICINE

## 2024-03-14 PROCEDURE — 6360000002 HC RX W HCPCS: Performed by: INTERNAL MEDICINE

## 2024-03-14 RX ORDER — LORAZEPAM 0.5 MG/1
0.5 TABLET ORAL EVERY 8 HOURS PRN
Qty: 21 TABLET | Refills: 0 | Status: SHIPPED | OUTPATIENT
Start: 2024-03-14 | End: 2024-03-21

## 2024-03-14 RX ORDER — OXYCODONE HYDROCHLORIDE AND ACETAMINOPHEN 5; 325 MG/1; MG/1
1 TABLET ORAL EVERY 6 HOURS PRN
Qty: 28 TABLET | Refills: 0 | Status: SHIPPED | OUTPATIENT
Start: 2024-03-14 | End: 2024-03-21

## 2024-03-14 RX ADMIN — METOPROLOL TARTRATE 5 MG: 5 INJECTION, SOLUTION INTRAVENOUS at 11:33

## 2024-03-14 RX ADMIN — ALBUMIN (HUMAN) 25 G: 0.25 INJECTION, SOLUTION INTRAVENOUS at 06:14

## 2024-03-14 RX ADMIN — ALBUMIN (HUMAN) 25 G: 0.25 INJECTION, SOLUTION INTRAVENOUS at 11:31

## 2024-03-14 RX ADMIN — PIPERACILLIN AND TAZOBACTAM 3375 MG: 3; .375 INJECTION, POWDER, LYOPHILIZED, FOR SOLUTION INTRAVENOUS at 07:18

## 2024-03-14 NOTE — CARE COORDINATION
Case Management Assessment  Initial Evaluation    Date/Time of Evaluation: 3/9/2024 2:05 PM  Assessment Completed by: Jie Robins RN    If patient is discharged prior to next notation, then this note serves as note for discharge by case management.    Patient Name: Octavio Slaughter                   YOB: 1940  Diagnosis: Abdominal pain [R10.9]  Hematemesis with nausea [K92.0]                   Date / Time: 3/8/2024  1:22 PM    Patient Admission Status: Inpatient   Readmission Risk (Low < 19, Mod (19-27), High > 27): Readmission Risk Score: 13.6    Current PCP: No primary care provider on file.  PCP verified by CM? No    Chart Reviewed: Yes      History Provided by: Patient  Patient Orientation: Alert and Oriented    Patient Cognition: Alert    Hospitalization in the last 30 days (Readmission):  No    If yes, Readmission Assessment in CM Navigator will be completed.    Advance Directives:      Code Status: DNR-CCA   Patient's Primary Decision Maker is: Legal Next of Kin      Discharge Planning:    Patient lives with: Family Members Type of Home: House  Primary Care Giver: Self  Patient Support Systems include: Family Members   Current Financial resources: Medicare  Current community resources: None  Current services prior to admission: Durable Medical Equipment            Current DME: Walker            Type of Home Care services:  None    ADLS  Prior functional level: Independent in ADLs/IADLs  Current functional level: Independent in ADLs/IADLs    PT AM-PAC:   /24  OT AM-PAC:   /24    Family can provide assistance at DC: Yes  Would you like Case Management to discuss the discharge plan with any other family members/significant others, and if so, who? Yes (Child; Liberty)  Plans to Return to Present Housing: Yes  Other Identified Issues/Barriers to RETURNING to current housing: Weakness.   Potential Assistance needed at discharge: Durable Medical Equipment            Potential DME: Oxygen Therapy 
DISCHARGE PLANNING NOTE:    Plan remains for patient to be discharged home 3/14 @ 1pm [facility].    Electronically signed by Haritha Knight RN on 3/13/2024 at 10:41 AM    
DISCHARGE PLANNING NOTE:    The patient is being discharged home with [facility]. Transportation set up for [facility] at 1:00 PM. MEGHAN and DC orders faxed.    Electronically signed by Jie Robins RN on 3/14/2024 at 12:45 PM    
[facility] met with family at bedside. Patient does not meet criteria for IP hospice at this time. Patient would like to go home with [facility]. [facility] states they have a nurse available on Thursday 3/14 to admit at home. [facility] will work on medical equipment needed.     It was discussed with patient Paracentesis for comfort care and drain placement. Francia GIMENEZ will call and speak with Dr Paulson. Electronically signed by Marialuisa Benitez RN on 3/12/2024 at 3:09 PM   
ONGOING DISCHARGE PLAN:    Patient is alert and oriented x4.    Spoke with patient regarding discharge plan and patient confirms that plan is still home with Hospice.     Meeting with [facility] @ 2:30 PM on 3/12 per Palliative Care.    Surgery consult-partial SBO  NG-LIWS    Nephrology consult-[facility]  creatinine 2.7    IR paracentesis    Oncology consult-colonic mass with liver metastasis    Venous scan pending    IV zosyn    GI consult-  Octreotide drip  Protonix drip    Cardiology consult-echo pending    PT/OT      Will continue to follow for additional discharge needs.    If patient is discharged prior to next notation, then this note serves as note for discharge by case management.    Electronically signed by Marialuisa Benitez RN on 3/11/2024 at 3:37 PM   
ONGOING DISCHARGE PLAN:    Patient is alert and oriented x4.    Spoke with patient regarding discharge plan and patient confirms that plan is still home with Hospice.     Nephrology consult-[facility]  creatinine 2.8    IR paracentesis w/ perc drain     Oncology consult-colonic mass with liver metastasis    Venous scan negative    IV zosyn    PT/OT    Transport arranged by [facility] for 3/14 @ 1pm via [facility]    Will continue to follow for additional discharge needs.    If patient is discharged prior to next notation, then this note serves as note for discharge by case management.    Electronically signed by Marialuisa Benitez RN on 3/12/2024 at 4:16 PM   
  Name:  Address:  Dialysis Schedule:  Phone:  Fax:    / signature: Electronically signed by Haritha Knight RN on 3/13/24 at 10:41 AM EDT    PHYSICIAN SECTION    Prognosis: Poor    Condition at Discharge: Stable    Rehab Potential (if transferring to Rehab): Poor    Recommended Labs or Other Treatments After Discharge:     Physician Certification: I certify the above information and transfer of Octavio Slaughter  is necessary for the continuing treatment of the diagnosis listed and that he requires Hospice for less 30 days.     Update Admission H&P: Changes in H&P as follows - code status change, DNR-CC    PHYSICIAN SIGNATURE:  Electronically signed by Adam Valentino MD on 3/14/24 at 8:47 AM EDT    Medication List    START taking these medications    START taking these medications  LORazepam 0.5 MG tablet  Commonly known as: Ativan  Take 1 tablet by mouth every 8 hours as needed for Anxiety for up to 7 days. Max Daily Amount: 1.5 mg   oxyCODONE-acetaminophen 5-325 MG per tablet  Commonly known as: Percocet  Take 1 tablet by mouth every 6 hours as needed for Pain for up to 7 days. Intended supply: 7 days. Take lowest dose possible to manage pain Max Daily Amount: 4 tablets     CONTINUE taking these medications    CONTINUE taking these medications  ibuprofen 200 MG tablet  Commonly known as: ADVIL;MOTRIN  Take 1 tablet by mouth every 6 hours as needed for Pain

## 2024-03-14 NOTE — DISCHARGE SUMMARY
loculated ascites is present. Multifocal peritoneal nodularity is present.  Discrete lymphadenopathy is seen adjacent to the KORINA measuring 1.9 x 1.7 cm. Abdominal aorta demonstrates severe atherosclerosis.  No aneurysmal dilatation. Bones/Soft Tissues: No acute fracture or dislocation.  Sclerotic lesion is identified involving the L4 vertebral body.     1. Interval appearance of fluid-filled small bowel distention with multifocal transition points.  Findings are compatible with partial small bowel obstruction related to peritoneal implants.  Esophagus and stomach are fluid-filled and distended.  Patient would benefit by nasogastric tube placement. 2. Apple core lesion of the sigmoid colon over a length of 7 cm compatible with malignancy. 3. Peritoneal carcinomatosis, metastatic lymphadenopathy along KORINA, liver metastases, and osseous metastases. 4. Small bilateral pleural effusions with bibasilar consolidations suggestive of atelectasis over infection.     CT ABDOMEN PELVIS W IV CONTRAST Additional Contrast? None    Result Date: 3/12/2024  EXAMINATION: CT OF THE ABDOMEN AND PELVIS WITH CONTRAST; CT OF THE CHEST WITH CONTRAST 3/11/2024 3:48 am TECHNIQUE: CT of the abdomen and pelvis was performed with the administration of intravenous contrast. Multiplanar reformatted images are provided for review. Automated exposure control, iterative reconstruction, and/or weight based adjustment of the mA/kV was utilized to reduce the radiation dose to as low as reasonably achievable.; CT of the chest was performed with the administration of intravenous contrast. Multiplanar reformatted images are provided for review. Automated exposure control, iterative reconstruction, and/or weight based adjustment of the mA/kV was utilized to reduce the radiation dose to as low as reasonably achievable. COMPARISON: 03/08/2024 HISTORY: ORDERING SYSTEM PROVIDED HISTORY: bowel obstruction TECHNOLOGIST PROVIDED HISTORY: bowel obstruction Reason

## 2024-03-14 NOTE — PLAN OF CARE
Problem: Discharge Planning  Goal: Discharge to home or other facility with appropriate resources  3/14/2024 0523 by Renetta Mckee RN  Outcome: Progressing     Problem: Safety - Adult  Goal: Free from fall injury  3/14/2024 0523 by Renetta Mckee RN  Outcome: Progressing     Problem: ABCDS Injury Assessment  Goal: Absence of physical injury  3/14/2024 0523 by Renetta Mckee RN  Outcome: Progressing     Problem: Skin/Tissue Integrity  Goal: Absence of new skin breakdown  Description: 1.  Monitor for areas of redness and/or skin breakdown  2.  Assess vascular access sites hourly  3.  Every 4-6 hours minimum:  Change oxygen saturation probe site  4.  Every 4-6 hours:  If on nasal continuous positive airway pressure, respiratory therapy assess nares and determine need for appliance change or resting period.  3/14/2024 0523 by Renetta Mckee RN  Outcome: Progressing     Problem: Pain  Goal: Verbalizes/displays adequate comfort level or baseline comfort level  3/14/2024 0523 by Renetta Mckee RN  Outcome: Progressing

## 2024-03-14 NOTE — PROGRESS NOTES
_                         Today's Date: 3/10/2024  Patient Name: Octavio Slaughter  Date of admission: 3/8/2024  1:22 PM  Patient's age: 83 y.o., 1940  Admission Dx: Abdominal pain [R10.9]  Hematemesis with nausea [K92.0]      Requesting Physician: Lavern Childers MD    CHIEF COMPLAINT: Emesis.  Hematemesis.  Weight loss.  Colonic mass.  Liver metastasis.    SUBJECTIVE:  .  Patient was seen and examined.  He is clinically stable.  No significant complaints at the present time.  No abdominal pain.  No nausea or vomiting.  No fever.    BRIEF CASE HISTORY:    The patient is a 83 y.o.  male who is admitted to the hospital for further management of multiple problems as listed above.  Patient was totally healthy and he did not see a physician for a very long period of time.  He recently started having increasing emesis over the last 2 to 3 weeks.  He lost more than 20 pounds.  He had episodes of hematemesis.  No lower GI bleeding.  No hematochezia or melena.  No bowel obstruction.  No fever or infections.  He was evaluated in the emergency room and he had CT scans which showed sigmoid colon mass.  He had liver metastasis.  \"Being consulted for further management.  GI also were consulted.  Patient denies smoking or alcohol drinking.    Past Medical History:   has no past medical history on file.    Past Surgical History:   has no past surgical history on file.   Family History: family history is not on file.  Social History:   reports that he has never smoked. He has never used smokeless tobacco. He reports that he does not currently use alcohol. He reports that he does not use drugs.   Medications:    Prior to Admission medications    Medication Sig Start Date End Date Taking? Authorizing Provider   ibuprofen (ADVIL;MOTRIN) 200 MG tablet Take 1 tablet by mouth every 6 hours as needed for Pain   Yes Provider, MD Heavenly     Current Facility-Administered 
                                                  _                         Today's Date: 3/12/2024  Patient Name: Octavio Slaughter  Date of admission: 3/8/2024  1:22 PM  Patient's age: 83 y.o., 1940  Admission Dx: Abdominal pain [R10.9]  Hematemesis with nausea [K92.0]      Requesting Physician: Lavern Childers MD    CHIEF COMPLAINT: Emesis.  Hematemesis.  Weight loss.  Colonic mass.  Liver metastasis.    SUBJECTIVE:  .  Patient was seen and examined.  Patient developed small bowel obstruction.  Seems to be resolving.  NG tube was removed.  No vomiting.  He had bowel movement today.  Patient and family agreed to hospice care.      BRIEF CASE HISTORY:    The patient is a 83 y.o.  male who is admitted to the hospital for further management of multiple problems as listed above.  Patient was totally healthy and he did not see a physician for a very long period of time.  He recently started having increasing emesis over the last 2 to 3 weeks.  He lost more than 20 pounds.  He had episodes of hematemesis.  No lower GI bleeding.  No hematochezia or melena.  No bowel obstruction.  No fever or infections.  He was evaluated in the emergency room and he had CT scans which showed sigmoid colon mass.  He had liver metastasis.  \"Being consulted for further management.  GI also were consulted.  Patient denies smoking or alcohol drinking.    Past Medical History:   has no past medical history on file.    Past Surgical History:   has no past surgical history on file.   Family History: family history is not on file.  Social History:   reports that he has never smoked. He has never used smokeless tobacco. He reports that he does not currently use alcohol. He reports that he does not use drugs.   Medications:    Prior to Admission medications    Medication Sig Start Date End Date Taking? Authorizing Provider   ibuprofen (ADVIL;MOTRIN) 200 MG tablet Take 1 tablet by mouth every 6 hours as needed for Pain   Yes Provider, 
      Physical Therapy Cancel Note      DATE: 3/11/2024    NAME: Octavio Slaughter  MRN: [id number]   : 1940      Patient not seen this date for Physical Therapy due to:    Cx, pt just received NG tube and is having a rough morning  Pt politely declines bed exercises at this time.  RN Travis is bedside and aware.  Will check back tomorrow.     Electronically signed by Marialuisa Baeza PTA on 3/11/2024 at 10:56 AM    
    [facility]   IN-PATIENT SERVICE   [facility]    HISTORY AND PHYSICAL EXAMINATION            Date:   3/9/2024  Patient name:  Octavio Slaughter  Date of admission:  3/8/2024  1:22 PM  MRN:   [id number]  Account:  [id number]  YOB: 1940  PCP:    No primary care provider on file.  Room:   [address] [place] [address]  Code Status:    DNR-CCA    Chief Complaint:     Chief Complaint   Patient presents with    Hematemesis     Pt states coffee ground emesis x2 weeks       History Obtained From:     patient    History of Present Illness:     The patient is a 83 y.o.  Non- / non  male who presents with Hematemesis (Pt states coffee ground emesis x2 weeks)   and he is admitted to the hospital for the management of      Patient is 83-year-old male with no previous known medical history, has not seen doctor in over 50 years presented to the ER with multiple complaints  Patient niece was present at the bedside as well, patient lives at home with the niece  States that he has been sick for last couple of weeks, has lost 30 pounds of weight in the last 2 weeks, did have some shortness of breath dizziness lightheadedness  Has had 2 episodes of hematemesis yesterday  So decided to come to ER  In the ER patient had abdominal CT which showed sigmoid mass likely colon cancer with hepatic metastatic lesions ascites lytic lesion on L4 acute esophagitis,  Patient states that his belly is more distended lately and legs are also getting swollen  No previous lab work    Past Medical History:     History reviewed. No pertinent past medical history.     Past Surgical History:     History reviewed. No pertinent surgical history.     Medications Prior to Admission:     Prior to Admission medications    Medication Sig Start Date End Date Taking? Authorizing Provider   ibuprofen (ADVIL;MOTRIN) 200 MG tablet Take 1 tablet by mouth every 6 hours as needed for Pain   Yes Provider, Heavenly, MD        Allergies: 
    [facility]    Gastroenterology Daily Progress Note      Patient:   Octavio Slaughter   :    1940   Facility[facility]   [facility]  Date:     3/10/2024  Consultant:   MITCHELL Mills - CNP, CNP      SUBJECTIVE  83 y.o. male admitted 3/8/2024 with Abdominal pain [R10.9]  Hematemesis with nausea [K92.0] and seen for anemia with coffee ground emesis, colon and liver masses. The pt was seen and examined. No abdominal pain or further emesis overnight. Hgb 9.4 creat 2.3.        OBJECTIVE  Scheduled Meds:   furosemide  20 mg IntraVENous Daily    sodium chloride flush  5-40 mL IntraVENous 2 times per day    sodium chloride flush  5-40 mL IntraVENous 2 times per day    [Held by provider] enoxaparin  40 mg SubCUTAneous Daily    piperacillin-tazobactam  3,375 mg IntraVENous Q8H       Vital Signs:  /68   Pulse (!) 103   Temp 98.5 °F (36.9 °C) (Oral)   Resp 20   Ht 1.854 m (6' 1\")   Wt 89.9 kg (198 lb 3.1 oz)   SpO2 94%   BMI 26.15 kg/m²    Review of Systems - History obtained from chart review and the patient  General ROS: positive for  - fatigue  Respiratory ROS: no cough, shortness of breath, or wheezing  Cardiovascular ROS: no chest pain or dyspnea on exertion  Gastrointestinal ROS: positive for - constipation and abdominal distention   Physical Exam:       General Appearance: ill appearing alert and oriented to person, place and time, well-developed and well-nourished, in no acute distress  Skin: warm and dry, no rash or erythema  Head: normocephalic and atraumatic  Eyes: pupils equal, round, and reactive to light, extraocular eye movements intact, conjunctivae normal  ENT: hearing grossly normal bilaterally  Neck: neck supple and non tender without mass, no thyromegaly or thyroid nodules, no cervical lymphadenopathy   Pulmonary/Chest: clear to auscultation bilaterally- no wheezes, rales or rhonchi, normal air movement, no respiratory distress  Cardiovascular: tachycardic rate, 
    [facility]   IN-PATIENT SERVICE   [facility]    Progress note            Date:   3/11/2024  Patient name:  Octavio Slaughter  Date of admission:  3/8/2024  1:22 PM  MRN:   [id number]  Account:  [id number]  YOB: 1940  PCP:    No primary care provider on file.  Room:   2015/2015-01  Code Status:    DNR-CCA    Chief Complaint:     Chief Complaint   Patient presents with    Hematemesis     Pt states coffee ground emesis x2 weeks       History Obtained From:     patient    History of Present Illness:     The patient is a 83 y.o.  Non- / non  male who presents with Hematemesis (Pt states coffee ground emesis x2 weeks)   and he is admitted to the hospital for the management of      Patient is 83-year-old male with no previous known medical history, has not seen doctor in over 50 years presented to the ER with multiple complaints  Patient niece was present at the bedside as well, patient lives at home with the niece  States that he has been sick for last couple of weeks, has lost 30 pounds of weight in the last 2 weeks, did have some shortness of breath dizziness lightheadedness  Has had 2 episodes of hematemesis yesterday  So decided to come to ER  In the ER patient had abdominal CT which showed sigmoid mass likely colon cancer with hepatic metastatic lesions ascites lytic lesion on L4 acute esophagitis,  Patient states that his belly is more distended lately and legs are also getting swollen  No previous lab work    Past Medical History:     History reviewed. No pertinent past medical history.     Past Surgical History:     History reviewed. No pertinent surgical history.     Medications Prior to Admission:     Prior to Admission medications    Medication Sig Start Date End Date Taking? Authorizing Provider   ibuprofen (ADVIL;MOTRIN) 200 MG tablet Take 1 tablet by mouth every 6 hours as needed for Pain   Yes Provider, MD Heavenly        Allergies:     Patient has no 
    [facility]   IN-PATIENT SERVICE   [facility]    Progress note            Date:   3/12/2024  Patient name:  Octavio Slaughter  Date of admission:  3/8/2024  1:22 PM  MRN:   [id number]  Account:  [id number]  YOB: 1940  PCP:    No primary care provider on file.  Room:   [address] [place] [address]  Code Status:    DNR-CC    Chief Complaint:     Chief Complaint   Patient presents with    Hematemesis     Pt states coffee ground emesis x2 weeks       History Obtained From:     patient    History of Present Illness:     The patient is a 83 y.o.  Non- / non  male who presents with Hematemesis (Pt states coffee ground emesis x2 weeks)   and he is admitted to the hospital for the management of      Patient is 83-year-old male with no previous known medical history, has not seen doctor in over 50 years presented to the ER with multiple complaints  Patient niece was present at the bedside as well, patient lives at home with the niece  States that he has been sick for last couple of weeks, has lost 30 pounds of weight in the last 2 weeks, did have some shortness of breath dizziness lightheadedness  Has had 2 episodes of hematemesis yesterday  So decided to come to ER  In the ER patient had abdominal CT which showed sigmoid mass likely colon cancer with hepatic metastatic lesions ascites lytic lesion on L4 acute esophagitis,  Patient states that his belly is more distended lately and legs are also getting swollen  No previous lab work    Past Medical History:     History reviewed. No pertinent past medical history.     Past Surgical History:     History reviewed. No pertinent surgical history.     Medications Prior to Admission:     Prior to Admission medications    Medication Sig Start Date End Date Taking? Authorizing Provider   ibuprofen (ADVIL;MOTRIN) 200 MG tablet Take 1 tablet by mouth every 6 hours as needed for Pain   Yes Provider, MD Heavenly        Allergies:     Patient has no known 
   03/12/24 1427   Encounter Summary   Encounter Overview/Reason  Attempted Encounter   Service Provided For: Patient not available  (patient having hospice meeting)       
   03/13/24 1545   Encounter Summary   Encounter Overview/Reason  Spiritual/Emotional Needs   Service Provided For: Patient   Referral/Consult From: Palliative Care   Last Encounter  03/13/24   Complexity of Encounter Moderate   Spiritual/Emotional needs   Type Spiritual Support   Palliative Care   Type Palliative Care, Follow-up   Assessment/Intervention/Outcome   Assessment Calm;Coping;Peaceful   Intervention Active listening;Explored/Affirmed feelings, thoughts, concerns;Prayer (assurance of)/[place];Sustaining Presence/Ministry of presence   Outcome Acceptance;Engaged in conversation;Receptive       
  [facility] General Surgery   Adarsh Perla MD, FACS  Jackelyn MFalguni Mono, APRN-CNP  [address], [address]  [place] [address]  P: [phone], F: [phone]    General and Robotic Surgery  Progress Note         PATIENT NAME: Octavio Slaughter   :  1940   MRN: [id number]   PCP:  No primary care provider on file.   Referring Physician: Dr. Paulson  Reason for Consult: Abnormal CT abdomen pelvis    TODAY'S DATE: 3/12/2024    HISTORY OF PRESENT ILLNESS: 83 y.o. male seen and examined at bedside later this afternoon.  Family is at bedside with him, hospice meeting just completed.  Per family and patient plan is for patient to be discharged home possibly on Thursday with hospice care.  Patient is feeling well today.  Denies abdominal pain.  Denies nausea and vomiting.  Did have a bowel movement, no bloody or black tarry stools.  Denies fever and chills.    PAST MEDICAL HISTORY   History reviewed. No pertinent past medical history.    PROBLEM LIST     Patient Active Problem List   Diagnosis    Abdominal pain    Hematemesis with nausea    Troponin level elevated    Tachycardia    PVC (premature ventricular contraction)    Edema    Colonic mass    Liver lesion    Weight loss    Goals of care, counseling/discussion    Acute respiratory failure (HCC)    DNR (do not resuscitate) discussion    ACP (advance care planning)    Palliative care encounter    Abdominal distention    SBO (small bowel obstruction) (HCC)       SURGICAL HISTORY     History reviewed. No pertinent surgical history.    ALLERGIES   No Known Allergies    FAMILY HISTORY   family history is not on file.    SOCIAL HISTORY    reports that he has never smoked. He has never used smokeless tobacco. He reports that he does not currently use alcohol. He reports that he does not use drugs.      Review of Systems   Constitutional:  Negative for chills and fever.   HENT:  Negative for congestion, rhinorrhea and sore throat.    Respiratory:  Negative 
  Physician Progress Note      PATIENT:               COY PADRON  [facility] #:                  [id number]  :                       1940  ADMIT DATE:       3/8/2024 1:22 PM  DISCH DATE:  RESPONDING  PROVIDER #:        Lavern Childers MD          QUERY TEXT:    Patient admitted with Malignant neoplasm of sigmoid colon. Noted to have   severe malnutrition in Nutrition progress note on 3/10/2024. If possible,   please document in progress notes and discharge summary if you are evaluating   and /or treating any of the following:    The medical record reflects the following:  Risk Factors: malignant neoplasm of sigmoid colon,    Clinical Indicators:  Per RD Malnutrition Assessment:  * Status:  Severe malnutrition (03/10/24 1458)  * Context:  Acute Illness  * Findings of the 6 clinical characteristics of malnutrition: (+ findings)  +  Energy Intake:  50% or less of estimated energy requirements for 5 or more   days  +  Weight Loss:  Greater than 7.5% over 3 months  +  Body Fat Loss:  Moderate body fat loss Orbital, Buccal region  +  Muscle Mass Loss:  Moderate muscle mass loss Temples (temporalis),   Clavicles (pectoralis & deltoids)    Nutrition Diagnosis:  + Severe malnutrition related to catabolic illness as evidenced by Criteria as   identified in malnutrition assessment, other (comment) (15.7% wt loss in 2   months)    Treatment: Dietician consulted, Oral Nutrition Supplement, Serial labs    ASPEN Criteria:    https://aspenjournals.onlinelibrary.latif.com/doi/full/10.1177/400531601527553  5    Thank you,  Anna CAO, RN, CCDS  Please contact me for any questions or concerns regarding this query at   [email or web address]  Options provided:  -- Protein calorie malnutrition severe  -- Other - I will add my own diagnosis  -- Disagree - Not applicable / Not valid  -- Disagree - Clinically unable to determine / Unknown  -- Refer to Clinical Documentation Reviewer    PROVIDER RESPONSE TEXT:    This patient has 
..PALLIATIVE CARE NURSING ASSESSMENT    Patient: Octavio Slaughter  Room: 2015/2015-01    Reason For Consult   Goals of care evaluation  Distress management  Guidance and support  Facilitate communications  Assistance in coordinating care    Code Status: DNRCC    SUMMARY:  -Pt met with hospice and plan to go home with hospice Thursday   -Pt to have a paracentesis with drain tomorrow for palliative drainage.  -Support offered to patient and his family.       Impression: Octavio Slaughter is a 83 y.o. year old male  has no past medical history on file..  Currently hospitalized for the management of abdominal pain.  The Palliative Care Team is following to assist with goals of care/support.     Vital Signs  Blood pressure 115/60, pulse 86, temperature 97.5 °F (36.4 °C), temperature source Axillary, resp. rate 24, height 1.854 m (6' 0.99\"), weight 92.4 kg (203 lb 11.3 oz), SpO2 96 %.    Patient Active Problem List   Diagnosis    Abdominal pain    Hematemesis with nausea    Troponin level elevated    Tachycardia    PVC (premature ventricular contraction)    Edema    Colonic mass    Liver lesion    Weight loss    Goals of care, counseling/discussion    Acute respiratory failure (HCC)    DNR (do not resuscitate) discussion    ACP (advance care planning)    Palliative care encounter    Abdominal distention    SBO (small bowel obstruction) ([facility])       Palliative Interaction: Pt resting in bed. Pt's niece and children are at bedside. Pt met with [facility] of [facility] and the plan will be to go home Thursday with hospice. Pt will be having a paracentesis with a drain placed tomorrow.  Emotional support offered to patient and his family.     Palliative Care Coordinator  Ina REYES, RN, PN    [place] Office: [phone]   [place] Office: [phone]    For Symptom Management Clinic scheduling please call [phone]     
CLINICAL PHARMACY NOTE: MEDS TO BEDS    Total # of Prescriptions Filled: 2   The following medications were delivered to the patient:  Oxycodone/APAP 5/325mg  Lorazepam 0.5mg     Additional Documentation: delivered to family Maggy Lizarraga in Med Surg family waiting area per Nurse Gladys morrison to deliver CII and controlled med to family 3/14/24 12:04pm Nurse Gladys collins [id number] complete  
Cardiology and Nephrology added to pt's list for consult.   
Comprehensive Nutrition Assessment    Type and Reason for Visit:  Positive Nutrition Screen    Nutrition Recommendations/Plan:   Continue clear liquid diet     Malnutrition Assessment:  Malnutrition Status:  Severe malnutrition (03/10/24 6396)    Context:  Acute Illness     Findings of the 6 clinical characteristics of malnutrition:  Energy Intake:  50% or less of estimated energy requirements for 5 or more days  Weight Loss:  Greater than 7.5% over 3 months     Body Fat Loss:  Moderate body fat loss Orbital, Buccal region   Muscle Mass Loss:  Moderate muscle mass loss Temples (temporalis), Clavicles (pectoralis & deltoids)  Fluid Accumulation:  No significant fluid accumulation     Strength:  Not Performed    Nutrition Assessment:    Positive nutrition screen for wt loss and decreased intake. Pt states that he has not seen a doctor since leaving the  in 1964. Pt states that he was 235lbs around 2 months ago and during that time has had vomiting and decreased intake. This admission pt was found to likely have stage IV cancer.    Nutrition Related Findings:    No edema. Labs and meds reviewed. Wound Type: None       Current Nutrition Intake & Therapies:    Average Meal Intake: 0%     ADULT DIET; Clear Liquid; No red dye  Diet NPO Exceptions are: Sips of Water with Meds    Anthropometric Measures:  Height: 185.4 cm (6' 0.99\")  Ideal Body Weight (IBW): 184 lbs (84 kg)    Admission Body Weight: 89.9 kg (198 lb 3.1 oz)  Current Body Weight: 89.9 kg (198 lb 3.1 oz), 107.7 % IBW. Weight Source: Bed Scale  Current BMI (kg/m2): 26.2    BMI Categories: Overweight (BMI 25.0-29.9)    Estimated Daily Nutrient Needs:  Energy Requirements Based On: Kcal/kg  Weight Used for Energy Requirements: Admission  Energy (kcal/day): 1980 based on 22kcal/kg  Weight Used for Protein Requirements: Admission  Protein (g/day): 100 based on 1.1g/kg  Method Used for Fluid Requirements: 1 ml/kcal  Fluid (ml/day):      Nutrition 
DATE: 3/12/2024    NAME: Octavio Slaughter  MRN: [id number]   : 1940    Patient not seen this date for Physical Therapy due to:      [] Cancel by RN or physician due to:    [] Hemodialysis    [] Critical Lab Value Level     [] Blood transfusion in progress    [] Acute or unstable cardiovascular status   _MAP < 55 or more than >115  _HR < 40 or > 130    [] Acute or unstable pulmonary status   -FiO2 > 60%   _RR < 5 or >40    _O2 sats < 85%    [] Strict Bedrest    [] Off Unit for surgery or procedure    [] Off Unit for testing       [] Pending imaging to R/O fracture    [x] Refusal by Patient, patient declined too tired, planned Hospice meeting @ 1430 today. Will continue to pursue at a later date if appropriate.     [] Other      [] PT being discontinued at this time. Patient independent. No further needs.     [] PT being discontinued at this time as the patient has been transferred to hospice care. No further needs.      Malgorzata Barron, PTA    
Discussed with Dr. Paulson.  Patient and family have opted for comfort care which seems reasonable to me.  They are meeting with hospice today.  I am following peripherally.    Cristhian Bran MD  Pulmonary and Critical Care  [phone] [facility]  [phone] Cell    
Dr Paulson at bedside, verbal order to remove NG tube this afternoon after morphine was given and before hospice meeting. Ok to have food afterwards.  
Dr. Flores notified of Na of 148 and patient's borderline BPs. See MAR for new orders.  
Dr. Zimmerman called RN for update on pt and stat CT results. RN relayed to physician that pt remains distended, pt had thrown up bile and didn't appear to be any blood while on PCU.  Pt has not had a bowel movement since starting bowel prep.  Pt is requiring 8L nasal cannula, tachycardic 130s but pt does not complain of any more discomfort.  Pt remains alert and oriented. Dr. Zimmerman said to try NG but he isn't sure if it will advance.  See orders.   
ELADIO Anand NP notified orders for protonix gtt had . Verified that gtt should still be running. See MAR for new orders.   
Hang Rodriguez MD (hem/onc) notified of consult.   
I call [facility] per the families wishes for home hospice care. I call Hospice and they will call the family for a meeting time.     I get in contact with Hospice and they contact family and they set a meeting time for tomorrow at 2:30pm.     I update staff as well as the CM.         Will follow for patient/family support.         ..[facility]  Donna Simpson RN,[facility]: [phone]  [facility]: [phone]  [facility]: [phone]   
I sent a [facility] palliative for a consult.  
IV access lost from 0100 til 0600. IV meds behind schedule.   
Informed by the nurse that patient family has decided for comfort measures and hospice  Will sign off  
[facility]   OCCUPATIONAL THERAPY MISSED TREATMENT NOTE   INPATIENT   Date: 3/12/24  Patient Name: Octavio Slaughter       Room:   MRN: [id number]   Account #: [id number]    : 1940  (83 y.o.)  Gender: male                 REASON FOR MISSED TREATMENT:  Patient declined -    Ok per PERNELL Feldman to attempt OT evaluation. Patient resting and sleeping in bed upon arrival, patient awaken with verbal cues. Writer introduced self and role of OT, with patient politely declining stating \"I'm tired\" and \"I'm sleepy.\"  Patient is scheduled for hospice meeting at 1430 today. OT will continue to follow. [id number]    Electronically signed by Mayela Finch OT on 3/12/24 at 8:51 AM EDT   
[facility]   OCCUPATIONAL THERAPY MISSED TREATMENT NOTE   INPATIENT   Date: 3/11/24  Patient Name: Octavio Slaughter       Room:   MRN: [id number]   Account #: [id number]    : 1940  (83 y.o.)  Gender: male                 REASON FOR MISSED TREATMENT:  3/11/24    -    Refusal by Patient - Pt not feeling well, having a rough day d/t NG tube. OT will continue to follow and attempt as able.    1445       Electronically signed by JENNA Posada on 3/11/24 at 3:40 PM EDT    
Patient discharged home with hospice via [facility] transportation.  Patient's belonging and prescriptions sent home with patient's family.    
Patient is being discharged with hospice.  Patient is DNR CC.  Pulmonary/critical care will sign off.  Please reconsult if we can be of any assistance      Allie Herrera NP-C  [facility]  
Patient transported to IR for drain placement/ paracentesis at this time. Family notified in lobby.    
Patient unavailable with nursing staff. Patient is scheduled to be discharged home with hospice.     03/14/24 1100   Encounter Summary   Encounter Overview/Reason  Spiritual/Emotional Needs   Service Provided For: Patient not available  (with nursing staff)   Referral/Consult From: Palliative Care   Support System Children;Family members   Last Encounter  03/14/24   Complexity of Encounter Moderate   Spiritual/Emotional needs   Type Spiritual Support   Palliative Care   Type Palliative Care, Follow-up   Assessment/Intervention/Outcome   Assessment Unable to assess   Intervention Prayer (assurance of)/[place]       
Pharmacy Medication History Note      List of current medications patient is taking is complete.     Source of information: patient, dispense report, OARRS    Changes made to medication list:  Medications flagged for removal (include reason, ex. noncompliance):  None    Medications removed (include reason, ex. therapy complete or physician discontinued):  None    Medications added/doses adjusted:  Ibuprofen 200 mg every 6 hours as needed for pain    Other notes (ex. Recent course of antibiotics, Coumadin dosing):  Patient reports he takes no prescription medications, vitamins, or supplements. Reports the only thing he takes is ibuprofen as needed for pain.  OARRS negative    Denies use of other OTC or herbal medications.      Allergies clarified    Medication list provided to the patient: no  Medication education provided to the patient: none      Electronically signed by Dagmar Rice on 3/8/2024 at 2:55 PM                                                     
Physical Therapy        Physical Therapy Cancel Note      DATE: 3/13/2024    NAME: Octavio Slaughter  MRN: [id number]   : 1940      Patient not seen this date for Physical Therapy due to:    Patient is not appropriate for PT evaluation/treatment at this time d/t Cx; upon chart review it was noted patient excepted for hospice care. Patient to be discharged home with hospice. Will continue to follow for therapy needs.      Electronically signed by Ayah Levy PTA on 3/13/2024 at 10:57 AM      
Physical Therapy  Facility/Department: [facility] PROGRESSIVE CARE  Physical Therapy Initial Assessment    Name: Octavio Slaughter  : 1940  MRN: [id number]  Date of Service: 3/10/2024    Discharge Recommendations:  Patient would benefit from continued therapy after discharge, 24 hour supervision or assist   PT Equipment Recommendations  Other: may benefit from shower chair- see OT      Patient Diagnosis(es): The primary encounter diagnosis was Hematemesis with nausea. A diagnosis of Generalized abdominal pain was also pertinent to this visit.  Past Medical History:  has no past medical history on file.  Past Surgical History:  has no past surgical history on file.    Assessment   Assessment: 84 y/o male adm with hematemesis, dx with metastatic CA, undergoing further work up and medical plan. Pt demonstrates dgenralized weakness and debility and will likely need assist for mobility at discharge. He will benefit from continued skilled therapy while here and after d/c.  Treatment Diagnosis: impaired mobiltiy related to hematemesis and metastatic CA  Specific Instructions for Next Treatment: progress ambulation and up to chair as tolerated.  Therapy Prognosis:  (undetermined pending medical work up.)  Decision Making: High Complexity  Barriers to Learning: medical status  Requires PT Follow-Up: Yes  Activity Tolerance  Activity Tolerance: Patient limited by fatigue;Patient limited by pain  Activity Tolerance Comments: fair (-)     Plan   Physical Therapy Plan  General Plan: 5-7 times per week  Specific Instructions for Next Treatment: progress ambulation and up to chair as tolerated.  Safety Devices  Type of Devices: Bed alarm in place, Call light within reach, Patient at risk for falls, Nurse notified, Left in bed, Gait belt     Restrictions  Restrictions/Precautions  Restrictions/Precautions: Fall Risk, Bed Alarm (telemetry, oxygen)     Subjective   Pain: generalized body pain \" all over\"  General  Chart Reviewed: 
Provider called to bedside. Patient SP02 70 per cent. Patient placed on salter cannula. SP02 95 per cent. No BM noted after patient finished half bowel prep. Patient vomited while on his back one hour prior. Plan for stat imaging. Patient abdomen very distended.  
Provider notified that the patient requires 8 L on salter nasal cannula.  Plan to transfer patient to higher level of care.  Critical care consult.  
Pt said he knew he wasn't feeling good but no idea it was so serious; he hadn't been to a dr in decades. He was matter of fact when talking and said he had a good life. He expects to have hospice at home. Writer provided listening presence and prayer.    03/11/24 1839   Encounter Summary   Encounter Overview/Reason  Initial Encounter   Service Provided For: Patient   Referral/Consult From: Palliative Care   Support System Children;Family members   Last Encounter  03/11/24   Complexity of Encounter Moderate   Spiritual/Emotional needs   Type Spiritual Support   Palliative Care   Type Palliative Care, Initial/Spiritual Assessment   Assessment/Intervention/Outcome   Assessment Calm;Shock   Intervention Active listening;Discussed illness injury and it’s impact;Explored/Affirmed feelings, thoughts, concerns;Explored Coping Skills/Resources;Prayer (assurance of)/[place];Sustaining Presence/Ministry of presence   Outcome Engaged in conversation;Expressed feelings, needs, and concerns;Expressed Gratitude;Receptive       
Spoke with Sulma from Hospice, gave update. Dang will be here at 1430 for the meeting. She told me over the phone she is getting him accepted and should be able to get transport today for inpatient.   
The potassium/magnesium sliding scale has been automatically discontinued per Pharmacy and Therapeutic Committee approved policy because the patient has decreased renal function (CrCl<30 ml/min).  The patient's current K/Mag levels are currently:    Recent Labs     03/09/24  1200 03/10/24  0527 03/11/24  0809   K 4.5 3.9 4.0       Estimated Creatinine Clearance: 23 mL/min (A) (based on SCr of 2.7 mg/dL (H)).  For patients with decreased renal function (below 30ml/min) needing potassium/magnesium supplementation, please order individual bolus doses with appropriate monitoring.      Please contact the inpatient pharmacy at [phone] with any concerns.  Thank you.    Odell Lira RPH  3/11/2024  2:47 PM   
Writer made attempt to call Pt's daughter Liberty with update. Voicemail left and daughter instructed to call back if any questions.    
Writer notified by CTP that Pt was complaining of SOB and Pt O2 sats in 70s. RT notified and Pt placed on non-re breather at 15L. O2 sats up to 100%. Pt then placed on salter cannula at 8L. O2 in 90s. ELADIO Anand NP also notified.  
Writer received call from IR at this time to verify that they should continue with perc drain placement due to foreseen outpatient complications.  Physician to contact Dr. Valentino, awaiting update.     Will continue plan for drain placement as Hospice would like this placed before discharge.   
    Patient has no known allergies.    Social History:     Tobacco:    reports that he has never smoked. He has never used smokeless tobacco.  Alcohol:      reports that he does not currently use alcohol.  Drug Use:  reports no history of drug use.    Family History:     History reviewed. No pertinent family history.    Review of Systems:     Positive and Negative as described in HPI.    CONSTITUTIONAL: History for fatigue weight loss HEENT:  negative for vision, hearing changes, runny nose, throat pain  RESPIRATORY:  negative for shortness of breath, cough, congestion, wheezing.  CARDIOVASCULAR:  negative for chest pain, palpitations.  GASTROINTESTINAL: Positive for abdominal pain and hematemesis GENITOURINARY:  negative for difficulty of urination, burning with urination, frequency   INTEGUMENT:  negative for rash, skin lesions, easy bruising   HEMATOLOGIC/LYMPHATIC:  negative for swelling/edema   ALLERGIC/IMMUNOLOGIC:  negative for urticaria , itching  ENDOCRINE:  negative increase in drinking, increase in urination, hot or cold intolerance  MUSCULOSKELETAL:  negative joint pains, muscle aches, swelling of joints  NEUROLOGICAL:  negative for headaches, dizziness, lightheadedness, numbness, pain, tingling extremities  BEHAVIOR/PSYCH:  negative for depression, anxiety    Physical Exam:   /87   Pulse (!) 109   Temp 98.1 °F (36.7 °C) (Oral)   Resp 19   Ht 1.854 m (6' 1\")   Wt 89.9 kg (198 lb 3.1 oz)   SpO2 93%   BMI 26.15 kg/m²   Temp (24hrs), Av.1 °F (36.7 °C), Min:97.4 °F (36.3 °C), Max:98.5 °F (36.9 °C)    No results for input(s): \"POCGLU\" in the last 72 hours.    Intake/Output Summary (Last 24 hours) at 3/10/2024 1225  Last data filed at 3/10/2024 0645  Gross per 24 hour   Intake --   Output 1000 ml   Net -1000 ml       General Appearance:  alert, well appearing, and in no acute distress  Mental status: oriented to person, place, and time with normal affect  Head:  normocephalic, 
12:29 PM    COLORU Yellow 03/09/2024 12:29 PM    PHUR 5.0 03/09/2024 12:29 PM    WBCUA 0 TO 2 03/09/2024 12:29 PM    RBCUA 0 TO 2 03/09/2024 12:29 PM    BACTERIA None 03/09/2024 12:29 PM    SPECGRAV 1.026 03/09/2024 12:29 PM    LEUKOCYTESUR NEGATIVE 03/09/2024 12:29 PM    UROBILINOGEN Normal 03/09/2024 12:29 PM    BILIRUBINUR NEGATIVE 03/09/2024 12:29 PM    GLUCOSEU NEGATIVE 03/09/2024 12:29 PM    KETUA TRACE 03/09/2024 12:29 PM     Urine Sodium:   No results found for: \"MARY ELLEN\"  Urine Potassium:  No results found for: \"KUR\"  Urine Chloride:  No results found for: \"CLUR\"  Urine Osmolarity: No results found for: \"OSMOU\"  Urine Protein:   No results found for: \"TPU\"  Urine Creatinine:   No results found for: \"LABCREA\"  Urine Eosinophils:  No components found for: \"UEOS\"      IMPRESSION/RECOMMENDATIONS:      Assessment:   GILBERTO, nonoliguric likely cause includes prerenal azotemia versus ATN, also rule out urinary retention, no prior records no prior labs to compare kidney function, serum creatinine increased to 2.7 today   Vomiting coffee-ground emesis  Sigmoid colon mass with suspicious lesion multifocal lesion in the liver and lytic and sclerotic lesion on L4-suspicion for colon cancer with metastases        Plan:  Urine electrolytes  IV fluids   Strict I's and O  NGT in place, concern for small bowel obstruction.  Patient and family would like to discuss options with surgery team for SBO before proceeding with other treatment measures.    Martha Cotto DO  3/11/2024 8:08 AM     Addendum to Resident Aceves note:   Pt seen,examined and evaluated. I have reviewed the current history, physical findings, labs and assessment and plan and agree with note as documented by resident physician.    Jorje Flores MD     
(premature ventricular contraction)     Edema     Colonic mass     Liver lesion     Weight loss      Plan of Treatment:   Elevated tropes almost flat doubt ACS most likely type II. Denies any pain and no ECG changes. Echo pending.  Sigmoid mass presumably colon cancer with mets, now with increased distention and concern for SBO on imaging. GI/surgery following.  Patient is a moderate risk at this time for surgical procedures per Dr. Walter, pending echo evaluation  Will switch BB to IV given strict NPO with bowel issues above.       Isabela Pool, APRN - CNP  [facility]  [phone]       
Food in the Last Year: Never true     Ran Out of Food in the Last Year: Never true   Transportation Needs: No Transportation Needs (3/8/2024)    PRAPARE - Transportation     Lack of Transportation (Medical): No     Lack of Transportation (Non-Medical): No   Physical Activity: Not on file   Stress: Not on file   Social Connections: Not on file   Intimate Partner Violence: Not on file   Housing Stability: Low Risk  (3/8/2024)    Housing Stability Vital Sign     Unable to Pay for Housing in the Last Year: No     Number of Places Lived in the Last Year: 1     Unstable Housing in the Last Year: No           Objective:  CURRENT TEMPERATURE:  Temp: 98.4 °F (36.9 °C)  MAXIMUM TEMPERATURE OVER 24HRS:  Temp (24hrs), Av.4 °F (36.9 °C), Min:97.7 °F (36.5 °C), Max:98.8 °F (37.1 °C)    CURRENT RESPIRATORY RATE:  Respirations: 19  CURRENT PULSE:  Pulse: 71  CURRENT BLOOD PRESSURE:  BP: (!) 100/51  24HR BLOOD PRESSURE RANGE:  Systolic (24hrs), Av , Min:87 , Max:139   ; Diastolic (24hrs), Av, Min:46, Max:99    24HR INTAKE/OUTPUT:    Intake/Output Summary (Last 24 hours) at 3/11/2024 1848  Last data filed at 3/11/2024 1838  Gross per 24 hour   Intake 1296 ml   Output 1115 ml   Net 181 ml     Patient Vitals for the past 96 hrs (Last 3 readings):   Weight   24 1845 89.9 kg (198 lb 3.1 oz)   24 1319 87.5 kg (193 lb)       Physical Exam:  GENERAL APPEARANCE: Alert and cooperative, and appears to be in no acute distress.  HEAD: normocephalic  EYES:  EOMI. Not pale, anicteric   NOSE:  No nasal discharge.    THROAT:  Oral cavity and pharynx normal. Moist  CARDIAC: Normal S1 and S2. No S3, S4 or murmurs. Rhythm is regular.  LUNGS:  diminished breath sounds.  No wheezing no accessory muscle use  NECK: Neck supple, non-tender   GI-soft distended abdomen  MUSKULOSKELETAL: Adequately aligned spine. No joint erythema or tenderness.   EXTREMITIES: No edema. Peripheral pulses intact.   NEURO: Nonfocal      Labs:   
presumably colon cancer with mets  Patient is getting endoscopy by GI tomorrow  Patient was also supposed to get paracentesis for diagnostic and therapeutic  Patient is a moderate risk  From cardiac point of view we will continue to watch  Waiting for the echocardiogram result  CKD  Patient is DNR CC A.    Jae Walter MD, MD [facility]  [phone]       
   Liver lesion 3/9/2024 Yes    Weight loss 3/9/2024 Yes    Goals of care, counseling/discussion 3/11/2024 Yes    Acute respiratory failure (HCC) 3/11/2024 Yes    DNR (do not resuscitate) discussion 3/11/2024 Yes    ACP (advance care planning) 3/11/2024 Yes    Palliative care encounter 3/11/2024 Yes    Abdominal distention 3/11/2024 Yes    SBO (small bowel obstruction) (HCC) 3/11/2024 Yes         ASSESSMENT   83 y.o. 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  No acute abdominal findings today.  On a regular diet.  Has not had dinner yet.  Status post peritoneal drain placement.    PLAN  Diet as tolerated.  More awake alert in no acute distress.  Patient and the family wishes noted regarding comfort care only.  Comfort measures were started.  NG is out.  Home on Thursday with home hospice.  Nothing further to add at this time.  I will sign off the case.  Please call me as needed.    The patient, Octavio Slaughter is a 83 y.o. male, was seen with a total time spent of 35 minutes for the visit on this date of service by the E/M provider. The time component had both face to face and non face to face time spent in determining the total time component.  Counseling and education regarding the patient's diagnosis listed below and the patient's options regarding those diagnoses were also included in determining the time component.      Electronically signed by Adarsh Perla MD  on 3/13/2024 at 10:01 PM   
TO 2*   RBCUA 0 TO 2   BACTERIA None   SPECGRAV 1.026   LEUKOCYTESUR NEGATIVE   UROBILINOGEN Normal   BILIRUBINUR NEGATIVE   GLUCOSEU NEGATIVE   KETUA TRACE*         Radiology:  Chest x-ray no acute cardiopulmonary process    Assessment:   GILBERTO, nonoliguric likely cause includes prerenal azotemia versus ATN, also rule out urinary retention, no prior records no prior labs to compare kidney function, serum creatinine increased to 2.4 mg/dL from 2.3 mg/dL  Vomiting coffee-ground emesis  Sigmoid colon mass with suspicious lesion multifocal lesion in the liver and lytic and sclerotic lesion on L4-suspicion for colon cancer with metastases       Plan:  Urine electrolytes  IV fluids   Strict I's and O  Plan for EGD colonoscopy tomorrow        Thank you for the consultation.      Electronically signed by Jorje Flores MD on 3/10/2024 at 3:25 PM  
ascites and peritoneal soft tissue densities? L4 lytic lesion     Family and patient discussing with Dr. Paulson finalizing plans for hospice  If the patient to be on hospice we recommend removing the NG tube  And pain medication  Will sign off    
daughter at bedside.  Daughter arrived from [place] today.  I explained the findings on the imaging with the liver metastasis likely originating from sigmoid colon.  His hematemesis is also quite concerning for possible upper GI pathology.  I recommend EGD and colonoscopy for evaluation of the hematemesis and any source of bleeding as well as establishing diagnosis.  Patient and daughter had some questions related to future plans of care and prognosis.  I explained that we are dealing with metastic disease regardless of its origin.  Treatment would be palliative and not curative but hopefully it will improve survival.  Definitive treatment recommendations will be determined after establishing pathological diagnosis.  Due to small bowel obstruction EGD and colonoscopy were not done.  NG tube was removed.  He had a bowel movement today.  Patient and family agreed to hospice care at home..  Will be discharged today.  Patient's questions were answered to the best of his satisfaction and he verbalized full understanding and agreement.                  Neeru Ruelas MD, MD [facility]                            This note is created with the assistance of a speech recognition program.  While intending to generate a document that actually reflects the content of the visit, the document can still have some errors including those of syntax and sound a like substitutions which may escape proof reading.  It such instances, actual meaning can be extrapolated by contextual diversion.  
points.  Findings are compatible with partial small bowel  obstruction related to peritoneal implants.  Esophagus and stomach are  fluid-filled and distended.  Patient would benefit by nasogastric tube  placement.  2. Apple core lesion of the sigmoid colon over a length of 7 cm compatible  with malignancy.  3. Peritoneal carcinomatosis, metastatic lymphadenopathy along KORINA, liver  metastases, and osseous metastases.  4. Small bilateral pleural effusions with bibasilar consolidations suggestive  of atelectasis over infection.  CT CHEST W CONTRAST  Narrative: EXAMINATION:  CT OF THE ABDOMEN AND PELVIS WITH CONTRAST; CT OF THE CHEST WITH CONTRAST  3/11/2024 3:48 am    TECHNIQUE:  CT of the abdomen and pelvis was performed with the administration of  intravenous contrast. Multiplanar reformatted images are provided for review.  Automated exposure control, iterative reconstruction, and/or weight based  adjustment of the mA/kV was utilized to reduce the radiation dose to as low  as reasonably achievable.; CT of the chest was performed with the  administration of intravenous contrast. Multiplanar reformatted images are  provided for review. Automated exposure control, iterative reconstruction,  and/or weight based adjustment of the mA/kV was utilized to reduce the  radiation dose to as low as reasonably achievable.    COMPARISON:  03/08/2024    HISTORY:  ORDERING SYSTEM PROVIDED HISTORY: bowel obstruction  TECHNOLOGIST PROVIDED HISTORY:  bowel obstruction    Reason for Exam: aspiration, bowel obstruction  Additional signs and symptoms: hematemesis, SOB; ORDERING SYSTEM PROVIDED  HISTORY: aspiration  TECHNOLOGIST PROVIDED HISTORY:  aspiration    Reason for Exam: aspiration, bowel obstruction  Additional signs and symptoms: SOB, hematemesis    FINDINGS:    Chest:    Mediastinum: The heart is mildly enlarged without a pericardial effusion.  Mild coronary artery atherosclerosis.  The aorta, arch branches, and main  pulmonary 
bladder.  The prostate is enlarged.    Peritoneum/Retroperitoneum: Large amount of loculated ascites is present.  Multifocal peritoneal nodularity is present.  Discrete lymphadenopathy is  seen adjacent to the KORINA measuring 1.9 x 1.7 cm.    Abdominal aorta demonstrates severe atherosclerosis.  No aneurysmal  dilatation.    Bones/Soft Tissues: No acute fracture or dislocation.  Sclerotic lesion is  identified involving the L4 vertebral body.  Impression: 1. Interval appearance of fluid-filled small bowel distention with multifocal  transition points.  Findings are compatible with partial small bowel  obstruction related to peritoneal implants.  Esophagus and stomach are  fluid-filled and distended.  Patient would benefit by nasogastric tube  placement.  2. Apple core lesion of the sigmoid colon over a length of 7 cm compatible  with malignancy.  3. Peritoneal carcinomatosis, metastatic lymphadenopathy along KORINA, liver  metastases, and osseous metastases.  4. Small bilateral pleural effusions with bibasilar consolidations suggestive  of atelectasis over infection.  CT CHEST W CONTRAST  Narrative: EXAMINATION:  CT OF THE ABDOMEN AND PELVIS WITH CONTRAST; CT OF THE CHEST WITH CONTRAST  3/11/2024 3:48 am    TECHNIQUE:  CT of the abdomen and pelvis was performed with the administration of  intravenous contrast. Multiplanar reformatted images are provided for review.  Automated exposure control, iterative reconstruction, and/or weight based  adjustment of the mA/kV was utilized to reduce the radiation dose to as low  as reasonably achievable.; CT of the chest was performed with the  administration of intravenous contrast. Multiplanar reformatted images are  provided for review. Automated exposure control, iterative reconstruction,  and/or weight based adjustment of the mA/kV was utilized to reduce the  radiation dose to as low as reasonably achievable.    COMPARISON:  03/08/2024    HISTORY:  ORDERING SYSTEM PROVIDED 
obstruction  Recommend surgery to follow  He has some tachycardia and tachypnea managed by the ICU/primary team  Will keep him n.p.o. at this time  NG to low intermittent suctioning  Will evaluate daily        Electronically signed by Perry Zimmerman MD       
MD Suraj, MD [facility]                            This note is created with the assistance of a speech recognition program.  While intending to generate a document that actually reflects the content of the visit, the document can still have some errors including those of syntax and sound a like substitutions which may escape proof reading.  It such instances, actual meaning can be extrapolated by contextual diversion.

## 2024-03-14 NOTE — PLAN OF CARE
Problem: Discharge Planning  Goal: Discharge to home or other facility with appropriate resources  3/14/2024 1232 by Gladys Traore RN  Outcome: Completed  3/14/2024 0608 by Renetta Mckee RN  Outcome: Progressing  3/14/2024 0523 by Renetta Mckee RN  Outcome: Progressing     Problem: Safety - Adult  Goal: Free from fall injury  3/14/2024 1232 by Gladys Traore RN  Outcome: Completed  3/14/2024 0608 by Renetta Mckee RN  Outcome: Progressing  3/14/2024 0523 by Renetta Mckee RN  Outcome: Progressing     Problem: ABCDS Injury Assessment  Goal: Absence of physical injury  3/14/2024 1232 by Gladys Traore RN  Outcome: Completed  3/14/2024 0608 by Renetta Mckee RN  Outcome: Progressing  3/14/2024 0523 by Renetta Mckee RN  Outcome: Progressing     Problem: Skin/Tissue Integrity  Goal: Absence of new skin breakdown  Description: 1.  Monitor for areas of redness and/or skin breakdown  2.  Assess vascular access sites hourly  3.  Every 4-6 hours minimum:  Change oxygen saturation probe site  4.  Every 4-6 hours:  If on nasal continuous positive airway pressure, respiratory therapy assess nares and determine need for appliance change or resting period.  3/14/2024 1232 by Gladys Traore RN  Outcome: Completed  3/14/2024 0608 by Renetta Mckee RN  Outcome: Progressing  3/14/2024 0523 by Renetta Mckee RN  Outcome: Progressing     Problem: Pain  Goal: Verbalizes/displays adequate comfort level or baseline comfort level  3/14/2024 1232 by Gladys Traore RN  Outcome: Completed  3/14/2024 0608 by Renetta Mckee RN  Outcome: Progressing  3/14/2024 0523 by Renetta Mckee RN  Outcome: Progressing

## 2024-03-14 NOTE — PROGRESS NOTES
He had a bowel movement today.  Patient and family agreed to hospice care at home..  Will be discharged today.  Patient's questions were answered to the best of his satisfaction and he verbalized full understanding and agreement.                  Neeru Ruelas MD, MD [facility]                            This note is created with the assistance of a speech recognition program.  While intending to generate a document that actually reflects the content of the visit, the document can still have some errors including those of syntax and sound a like substitutions which may escape proof reading.  It such instances, actual meaning can be extrapolated by contextual diversion.

## 2024-03-15 LAB — SURGICAL PATHOLOGY REPORT: NORMAL

## 2024-03-17 LAB
MICROORGANISM SPEC CULT: NORMAL
MICROORGANISM/AGENT SPEC: NORMAL
SPECIMEN DESCRIPTION: NORMAL

## 2024-03-18 ENCOUNTER — TELEPHONE (OUTPATIENT)
Dept: ONCOLOGY | Age: 84
End: 2024-03-18

## 2024-03-18 NOTE — TELEPHONE ENCOUNTER
Name: Octavio Slaughter  : 1940  MRN: <[id number]>    Oncology Navigation- Initial Note:    Intake-  Contact Type: Telephone    Continuum of Care: Diagnosis/Active Treatment    Notes: Writer received a referral for navigation. Writer notes that pt was going home with Hospice. Writer called pts daughter Liberty to confirm navigation wasn't needed and Liberty states her Dad passed away this am. Writer offered emotional support and condolences. Writer did provide her with my contact information in the event she would need help with anything as she lives in [place]. Liberty very appreciative of call.      Electronically signed by Ciera Junior RN on 3/18/2024 at 1:10 PM

## 2024-03-19 LAB
MICROORGANISM SPEC CULT: NORMAL
MICROORGANISM/AGENT SPEC: NORMAL
SPECIMEN DESCRIPTION: NORMAL